# Patient Record
Sex: FEMALE | Race: WHITE | NOT HISPANIC OR LATINO | Employment: OTHER | URBAN - METROPOLITAN AREA
[De-identification: names, ages, dates, MRNs, and addresses within clinical notes are randomized per-mention and may not be internally consistent; named-entity substitution may affect disease eponyms.]

---

## 2025-03-03 ENCOUNTER — HOSPITAL ENCOUNTER (OUTPATIENT)
Dept: RADIOLOGY | Facility: MEDICAL CENTER | Age: 80
End: 2025-03-03
Payer: MEDICARE

## 2025-03-03 ENCOUNTER — HOSPITAL ENCOUNTER (INPATIENT)
Facility: MEDICAL CENTER | Age: 80
LOS: 3 days | DRG: 737 | End: 2025-03-07
Attending: EMERGENCY MEDICINE | Admitting: SPECIALIST
Payer: MEDICARE

## 2025-03-03 DIAGNOSIS — R19.00 ABDOMINAL MASS, UNSPECIFIED ABDOMINAL LOCATION: ICD-10-CM

## 2025-03-03 DIAGNOSIS — R65.10 SIRS (SYSTEMIC INFLAMMATORY RESPONSE SYNDROME) (HCC): ICD-10-CM

## 2025-03-03 DIAGNOSIS — R10.30 LOWER ABDOMINAL PAIN: ICD-10-CM

## 2025-03-03 LAB
ALBUMIN SERPL BCP-MCNC: 3.7 G/DL (ref 3.2–4.9)
ALBUMIN/GLOB SERPL: 1.1 G/DL
ALP SERPL-CCNC: 78 U/L (ref 30–99)
ALT SERPL-CCNC: 21 U/L (ref 2–50)
ANION GAP SERPL CALC-SCNC: 13 MMOL/L (ref 7–16)
APTT PPP: 30.5 SEC (ref 24.7–36)
AST SERPL-CCNC: 34 U/L (ref 12–45)
BASOPHILS # BLD AUTO: 0 % (ref 0–1.8)
BASOPHILS # BLD: 0 K/UL (ref 0–0.12)
BILIRUB SERPL-MCNC: 0.7 MG/DL (ref 0.1–1.5)
BUN SERPL-MCNC: 8 MG/DL (ref 8–22)
CALCIUM ALBUM COR SERPL-MCNC: 8.8 MG/DL (ref 8.5–10.5)
CALCIUM SERPL-MCNC: 8.6 MG/DL (ref 8.5–10.5)
CHLORIDE SERPL-SCNC: 98 MMOL/L (ref 96–112)
CK SERPL-CCNC: 54 U/L (ref 0–154)
CO2 SERPL-SCNC: 24 MMOL/L (ref 20–33)
CREAT SERPL-MCNC: 0.66 MG/DL (ref 0.5–1.4)
EKG IMPRESSION: NORMAL
EOSINOPHIL # BLD AUTO: 0 K/UL (ref 0–0.51)
EOSINOPHIL NFR BLD: 0 % (ref 0–6.9)
ERYTHROCYTE [DISTWIDTH] IN BLOOD BY AUTOMATED COUNT: 40.6 FL (ref 35.9–50)
GFR SERPLBLD CREATININE-BSD FMLA CKD-EPI: 89 ML/MIN/1.73 M 2
GLOBULIN SER CALC-MCNC: 3.4 G/DL (ref 1.9–3.5)
GLUCOSE SERPL-MCNC: 129 MG/DL (ref 65–99)
HCT VFR BLD AUTO: 41.2 % (ref 37–47)
HGB BLD-MCNC: 13.9 G/DL (ref 12–16)
INR PPP: 1.32 (ref 0.87–1.13)
LYMPHOCYTES # BLD AUTO: 3.78 K/UL (ref 1–4.8)
LYMPHOCYTES NFR BLD: 17.2 % (ref 22–41)
MANUAL DIFF BLD: NORMAL
MCH RBC QN AUTO: 30.9 PG (ref 27–33)
MCHC RBC AUTO-ENTMCNC: 33.7 G/DL (ref 32.2–35.5)
MCV RBC AUTO: 91.6 FL (ref 81.4–97.8)
MONOCYTES # BLD AUTO: 2.09 K/UL (ref 0–0.85)
MONOCYTES NFR BLD AUTO: 9.5 % (ref 0–13.4)
MORPHOLOGY BLD-IMP: NORMAL
NEUTROPHILS # BLD AUTO: 16.13 K/UL (ref 1.82–7.42)
NEUTROPHILS NFR BLD: 73.3 % (ref 44–72)
NRBC # BLD AUTO: 0 K/UL
NRBC BLD-RTO: 0 /100 WBC (ref 0–0.2)
PLATELET # BLD AUTO: 272 K/UL (ref 164–446)
PLATELET BLD QL SMEAR: NORMAL
PMV BLD AUTO: 9.8 FL (ref 9–12.9)
POTASSIUM SERPL-SCNC: 3.6 MMOL/L (ref 3.6–5.5)
PROT SERPL-MCNC: 7.1 G/DL (ref 6–8.2)
PROTHROMBIN TIME: 16.4 SEC (ref 12–14.6)
RBC # BLD AUTO: 4.5 M/UL (ref 4.2–5.4)
RBC BLD AUTO: NORMAL
SODIUM SERPL-SCNC: 135 MMOL/L (ref 135–145)
WBC # BLD AUTO: 22 K/UL (ref 4.8–10.8)

## 2025-03-03 PROCEDURE — 83605 ASSAY OF LACTIC ACID: CPT

## 2025-03-03 PROCEDURE — 85610 PROTHROMBIN TIME: CPT

## 2025-03-03 PROCEDURE — 86905 BLOOD TYPING RBC ANTIGENS: CPT

## 2025-03-03 PROCEDURE — 86920 COMPATIBILITY TEST SPIN: CPT | Mod: 91

## 2025-03-03 PROCEDURE — 85027 COMPLETE CBC AUTOMATED: CPT

## 2025-03-03 PROCEDURE — 82550 ASSAY OF CK (CPK): CPT

## 2025-03-03 PROCEDURE — 80053 COMPREHEN METABOLIC PANEL: CPT

## 2025-03-03 PROCEDURE — 700105 HCHG RX REV CODE 258: Performed by: EMERGENCY MEDICINE

## 2025-03-03 PROCEDURE — 96375 TX/PRO/DX INJ NEW DRUG ADDON: CPT

## 2025-03-03 PROCEDURE — 93005 ELECTROCARDIOGRAM TRACING: CPT | Mod: TC | Performed by: EMERGENCY MEDICINE

## 2025-03-03 PROCEDURE — 36415 COLL VENOUS BLD VENIPUNCTURE: CPT

## 2025-03-03 PROCEDURE — 86922 COMPATIBILITY TEST ANTIGLOB: CPT

## 2025-03-03 PROCEDURE — 87040 BLOOD CULTURE FOR BACTERIA: CPT | Mod: 91

## 2025-03-03 PROCEDURE — 99285 EMERGENCY DEPT VISIT HI MDM: CPT

## 2025-03-03 PROCEDURE — 86850 RBC ANTIBODY SCREEN: CPT

## 2025-03-03 PROCEDURE — 85730 THROMBOPLASTIN TIME PARTIAL: CPT

## 2025-03-03 PROCEDURE — 86870 RBC ANTIBODY IDENTIFICATION: CPT

## 2025-03-03 PROCEDURE — 700111 HCHG RX REV CODE 636 W/ 250 OVERRIDE (IP): Mod: TB | Performed by: EMERGENCY MEDICINE

## 2025-03-03 PROCEDURE — 86901 BLOOD TYPING SEROLOGIC RH(D): CPT

## 2025-03-03 PROCEDURE — 85007 BL SMEAR W/DIFF WBC COUNT: CPT

## 2025-03-03 PROCEDURE — 86900 BLOOD TYPING SEROLOGIC ABO: CPT

## 2025-03-03 RX ORDER — SODIUM CHLORIDE, SODIUM LACTATE, POTASSIUM CHLORIDE, AND CALCIUM CHLORIDE .6; .31; .03; .02 G/100ML; G/100ML; G/100ML; G/100ML
1000 INJECTION, SOLUTION INTRAVENOUS ONCE
Status: COMPLETED | OUTPATIENT
Start: 2025-03-03 | End: 2025-03-04

## 2025-03-03 RX ORDER — TRAZODONE HYDROCHLORIDE 150 MG/1
150 TABLET ORAL NIGHTLY
COMMUNITY

## 2025-03-03 RX ORDER — SIMVASTATIN 40 MG
40 TABLET ORAL NIGHTLY
COMMUNITY

## 2025-03-03 RX ORDER — ONDANSETRON 2 MG/ML
4 INJECTION INTRAMUSCULAR; INTRAVENOUS ONCE
Status: COMPLETED | OUTPATIENT
Start: 2025-03-03 | End: 2025-03-03

## 2025-03-03 RX ORDER — LEVOTHYROXINE SODIUM 88 UG/1
88 TABLET ORAL
COMMUNITY

## 2025-03-03 RX ORDER — HYDROMORPHONE HYDROCHLORIDE 1 MG/ML
1 INJECTION, SOLUTION INTRAMUSCULAR; INTRAVENOUS; SUBCUTANEOUS
Status: DISCONTINUED | OUTPATIENT
Start: 2025-03-03 | End: 2025-03-04

## 2025-03-03 RX ORDER — MONTELUKAST SODIUM 10 MG/1
10 TABLET ORAL
COMMUNITY

## 2025-03-03 RX ADMIN — ONDANSETRON 4 MG: 2 INJECTION INTRAMUSCULAR; INTRAVENOUS at 23:44

## 2025-03-03 RX ADMIN — HYDROMORPHONE HYDROCHLORIDE 1 MG: 1 INJECTION, SOLUTION INTRAMUSCULAR; INTRAVENOUS; SUBCUTANEOUS at 23:42

## 2025-03-03 RX ADMIN — PIPERACILLIN AND TAZOBACTAM 3.38 G: 3; .375 INJECTION, POWDER, FOR SOLUTION INTRAVENOUS at 23:59

## 2025-03-03 RX ADMIN — SODIUM CHLORIDE, POTASSIUM CHLORIDE, SODIUM LACTATE AND CALCIUM CHLORIDE 1000 ML: 600; 310; 30; 20 INJECTION, SOLUTION INTRAVENOUS at 23:45

## 2025-03-03 ASSESSMENT — FIBROSIS 4 INDEX: FIB4 SCORE: 2.15

## 2025-03-03 ASSESSMENT — PAIN DESCRIPTION - PAIN TYPE: TYPE: ACUTE PAIN

## 2025-03-04 ENCOUNTER — ANESTHESIA (OUTPATIENT)
Dept: SURGERY | Facility: MEDICAL CENTER | Age: 80
DRG: 737 | End: 2025-03-04
Payer: MEDICARE

## 2025-03-04 ENCOUNTER — HOSPITAL ENCOUNTER (OUTPATIENT)
Dept: RADIOLOGY | Facility: MEDICAL CENTER | Age: 80
End: 2025-03-04

## 2025-03-04 ENCOUNTER — ANESTHESIA EVENT (OUTPATIENT)
Dept: SURGERY | Facility: MEDICAL CENTER | Age: 80
DRG: 737 | End: 2025-03-04
Payer: MEDICARE

## 2025-03-04 PROBLEM — R19.03 ABDOMINAL MASS, RIGHT LOWER QUADRANT: Status: ACTIVE | Noted: 2025-03-04

## 2025-03-04 PROBLEM — J45.909 ASTHMA: Status: ACTIVE | Noted: 2025-03-04

## 2025-03-04 LAB
ABO GROUP BLD: ABNORMAL
BARCODED ABORH UBTYP: 5100
BARCODED ABORH UBTYP: 5100
BARCODED PRD CODE UBPRD: ABNORMAL
BARCODED PRD CODE UBPRD: ABNORMAL
BARCODED UNIT NUM UBUNT: ABNORMAL
BARCODED UNIT NUM UBUNT: ABNORMAL
BLD GP AB INVEST PLASRBC-IMP: ABNORMAL
BLD GP AB INVEST PLASRBC-IMP: ABNORMAL
BLD GP AB SCN SERPL QL: ABNORMAL
COMPONENT R 8504R: ABNORMAL
COMPONENT R 8504R: ABNORMAL
LACTATE SERPL-SCNC: 1.3 MMOL/L (ref 0.5–2)
LITTLE C AG RBC QL: NORMAL
PRODUCT TYPE UPROD: ABNORMAL
PRODUCT TYPE UPROD: ABNORMAL
RH BLD: ABNORMAL
UNIT STATUS USTAT: ABNORMAL
UNIT STATUS USTAT: ABNORMAL

## 2025-03-04 PROCEDURE — 770004 HCHG ROOM/CARE - ONCOLOGY PRIVATE *

## 2025-03-04 PROCEDURE — 86902 BLOOD TYPE ANTIGEN DONOR EA: CPT

## 2025-03-04 PROCEDURE — 8E0W4CZ ROBOTIC ASSISTED PROCEDURE OF TRUNK REGION, PERCUTANEOUS ENDOSCOPIC APPROACH: ICD-10-PCS | Performed by: SPECIALIST

## 2025-03-04 PROCEDURE — A9270 NON-COVERED ITEM OR SERVICE: HCPCS | Performed by: EMERGENCY MEDICINE

## 2025-03-04 PROCEDURE — 88307 TISSUE EXAM BY PATHOLOGIST: CPT | Mod: 59 | Performed by: STUDENT IN AN ORGANIZED HEALTH CARE EDUCATION/TRAINING PROGRAM

## 2025-03-04 PROCEDURE — 88341 IMHCHEM/IMCYTCHM EA ADD ANTB: CPT | Mod: 91 | Performed by: STUDENT IN AN ORGANIZED HEALTH CARE EDUCATION/TRAINING PROGRAM

## 2025-03-04 PROCEDURE — 88342 IMHCHEM/IMCYTCHM 1ST ANTB: CPT | Mod: 26,59 | Performed by: STUDENT IN AN ORGANIZED HEALTH CARE EDUCATION/TRAINING PROGRAM

## 2025-03-04 PROCEDURE — 88307 TISSUE EXAM BY PATHOLOGIST: CPT | Mod: 26 | Performed by: STUDENT IN AN ORGANIZED HEALTH CARE EDUCATION/TRAINING PROGRAM

## 2025-03-04 PROCEDURE — 88331 PATH CONSLTJ SURG 1 BLK 1SPC: CPT | Performed by: STUDENT IN AN ORGANIZED HEALTH CARE EDUCATION/TRAINING PROGRAM

## 2025-03-04 PROCEDURE — 160031 HCHG SURGERY MINUTES - 1ST 30 MINS LEVEL 5: Performed by: SPECIALIST

## 2025-03-04 PROCEDURE — 160035 HCHG PACU - 1ST 60 MINS PHASE I: Performed by: SPECIALIST

## 2025-03-04 PROCEDURE — 700101 HCHG RX REV CODE 250: Performed by: SPECIALIST

## 2025-03-04 PROCEDURE — 700111 HCHG RX REV CODE 636 W/ 250 OVERRIDE (IP): Performed by: ANESTHESIOLOGY

## 2025-03-04 PROCEDURE — 700102 HCHG RX REV CODE 250 W/ 637 OVERRIDE(OP)

## 2025-03-04 PROCEDURE — 700111 HCHG RX REV CODE 636 W/ 250 OVERRIDE (IP): Mod: JZ | Performed by: EMERGENCY MEDICINE

## 2025-03-04 PROCEDURE — A9270 NON-COVERED ITEM OR SERVICE: HCPCS | Performed by: SPECIALIST

## 2025-03-04 PROCEDURE — 160002 HCHG RECOVERY MINUTES (STAT): Performed by: SPECIALIST

## 2025-03-04 PROCEDURE — 700111 HCHG RX REV CODE 636 W/ 250 OVERRIDE (IP): Mod: JZ | Performed by: ANESTHESIOLOGY

## 2025-03-04 PROCEDURE — 502714 HCHG ROBOTIC SURGERY SERVICES: Performed by: SPECIALIST

## 2025-03-04 PROCEDURE — 700111 HCHG RX REV CODE 636 W/ 250 OVERRIDE (IP): Mod: JZ | Performed by: SPECIALIST

## 2025-03-04 PROCEDURE — 700101 HCHG RX REV CODE 250: Performed by: ANESTHESIOLOGY

## 2025-03-04 PROCEDURE — 700102 HCHG RX REV CODE 250 W/ 637 OVERRIDE(OP): Performed by: SPECIALIST

## 2025-03-04 PROCEDURE — 160048 HCHG OR STATISTICAL LEVEL 1-5: Performed by: SPECIALIST

## 2025-03-04 PROCEDURE — 160042 HCHG SURGERY MINUTES - EA ADDL 1 MIN LEVEL 5: Performed by: SPECIALIST

## 2025-03-04 PROCEDURE — C1725 CATH, TRANSLUMIN NON-LASER: HCPCS | Performed by: SPECIALIST

## 2025-03-04 PROCEDURE — A9270 NON-COVERED ITEM OR SERVICE: HCPCS

## 2025-03-04 PROCEDURE — 700105 HCHG RX REV CODE 258: Performed by: EMERGENCY MEDICINE

## 2025-03-04 PROCEDURE — 0DTU4ZZ RESECTION OF OMENTUM, PERCUTANEOUS ENDOSCOPIC APPROACH: ICD-10-PCS | Performed by: SPECIALIST

## 2025-03-04 PROCEDURE — 88360 TUMOR IMMUNOHISTOCHEM/MANUAL: CPT | Mod: 26 | Performed by: STUDENT IN AN ORGANIZED HEALTH CARE EDUCATION/TRAINING PROGRAM

## 2025-03-04 PROCEDURE — 88342 IMHCHEM/IMCYTCHM 1ST ANTB: CPT | Performed by: STUDENT IN AN ORGANIZED HEALTH CARE EDUCATION/TRAINING PROGRAM

## 2025-03-04 PROCEDURE — 160009 HCHG ANES TIME/MIN: Performed by: SPECIALIST

## 2025-03-04 PROCEDURE — 0UT0FZZ RESECTION OF RIGHT OVARY, VIA NATURAL OR ARTIFICIAL OPENING WITH PERCUTANEOUS ENDOSCOPIC ASSISTANCE: ICD-10-PCS | Performed by: SPECIALIST

## 2025-03-04 PROCEDURE — 700105 HCHG RX REV CODE 258: Performed by: ANESTHESIOLOGY

## 2025-03-04 PROCEDURE — 160015 HCHG STAT PREOP MINUTES: Performed by: SPECIALIST

## 2025-03-04 PROCEDURE — 88360 TUMOR IMMUNOHISTOCHEM/MANUAL: CPT | Performed by: STUDENT IN AN ORGANIZED HEALTH CARE EDUCATION/TRAINING PROGRAM

## 2025-03-04 PROCEDURE — 700102 HCHG RX REV CODE 250 W/ 637 OVERRIDE(OP): Performed by: EMERGENCY MEDICINE

## 2025-03-04 PROCEDURE — 88341 IMHCHEM/IMCYTCHM EA ADD ANTB: CPT | Mod: 26,59 | Performed by: STUDENT IN AN ORGANIZED HEALTH CARE EDUCATION/TRAINING PROGRAM

## 2025-03-04 PROCEDURE — 88331 PATH CONSLTJ SURG 1 BLK 1SPC: CPT | Mod: 26 | Performed by: STUDENT IN AN ORGANIZED HEALTH CARE EDUCATION/TRAINING PROGRAM

## 2025-03-04 PROCEDURE — 0UT5FZZ RESECTION OF RIGHT FALLOPIAN TUBE, VIA NATURAL OR ARTIFICIAL OPENING WITH PERCUTANEOUS ENDOSCOPIC ASSISTANCE: ICD-10-PCS | Performed by: SPECIALIST

## 2025-03-04 PROCEDURE — 96365 THER/PROPH/DIAG IV INF INIT: CPT

## 2025-03-04 RX ORDER — ESMOLOL HYDROCHLORIDE 10 MG/ML
INJECTION INTRAVENOUS PRN
Status: DISCONTINUED | OUTPATIENT
Start: 2025-03-04 | End: 2025-03-04 | Stop reason: SURG

## 2025-03-04 RX ORDER — MEPERIDINE HYDROCHLORIDE 25 MG/ML
12.5 INJECTION INTRAMUSCULAR; INTRAVENOUS; SUBCUTANEOUS
Status: DISCONTINUED | OUTPATIENT
Start: 2025-03-04 | End: 2025-03-04 | Stop reason: HOSPADM

## 2025-03-04 RX ORDER — EPHEDRINE SULFATE 50 MG/ML
5 INJECTION, SOLUTION INTRAVENOUS
Status: DISCONTINUED | OUTPATIENT
Start: 2025-03-04 | End: 2025-03-04 | Stop reason: HOSPADM

## 2025-03-04 RX ORDER — HYDROMORPHONE HYDROCHLORIDE 1 MG/ML
0.4 INJECTION, SOLUTION INTRAMUSCULAR; INTRAVENOUS; SUBCUTANEOUS
Status: DISCONTINUED | OUTPATIENT
Start: 2025-03-04 | End: 2025-03-04 | Stop reason: HOSPADM

## 2025-03-04 RX ORDER — BUPIVACAINE HYDROCHLORIDE AND EPINEPHRINE 2.5; 5 MG/ML; UG/ML
INJECTION, SOLUTION EPIDURAL; INFILTRATION; INTRACAUDAL; PERINEURAL
Status: DISCONTINUED | OUTPATIENT
Start: 2025-03-04 | End: 2025-03-04 | Stop reason: HOSPADM

## 2025-03-04 RX ORDER — LABETALOL HYDROCHLORIDE 5 MG/ML
5 INJECTION, SOLUTION INTRAVENOUS
Status: DISCONTINUED | OUTPATIENT
Start: 2025-03-04 | End: 2025-03-04 | Stop reason: HOSPADM

## 2025-03-04 RX ORDER — LIDOCAINE HYDROCHLORIDE 40 MG/ML
SOLUTION TOPICAL PRN
Status: DISCONTINUED | OUTPATIENT
Start: 2025-03-04 | End: 2025-03-04 | Stop reason: SURG

## 2025-03-04 RX ORDER — SODIUM CHLORIDE, SODIUM LACTATE, POTASSIUM CHLORIDE, CALCIUM CHLORIDE 600; 310; 30; 20 MG/100ML; MG/100ML; MG/100ML; MG/100ML
INJECTION, SOLUTION INTRAVENOUS
Status: DISCONTINUED | OUTPATIENT
Start: 2025-03-04 | End: 2025-03-04 | Stop reason: SURG

## 2025-03-04 RX ORDER — ONDANSETRON 2 MG/ML
INJECTION INTRAMUSCULAR; INTRAVENOUS PRN
Status: DISCONTINUED | OUTPATIENT
Start: 2025-03-04 | End: 2025-03-04 | Stop reason: SURG

## 2025-03-04 RX ORDER — HALOPERIDOL 5 MG/ML
1 INJECTION INTRAMUSCULAR
Status: DISCONTINUED | OUTPATIENT
Start: 2025-03-04 | End: 2025-03-04 | Stop reason: HOSPADM

## 2025-03-04 RX ORDER — HYDROMORPHONE HYDROCHLORIDE 1 MG/ML
1 INJECTION, SOLUTION INTRAMUSCULAR; INTRAVENOUS; SUBCUTANEOUS
Status: COMPLETED | OUTPATIENT
Start: 2025-03-04 | End: 2025-03-04

## 2025-03-04 RX ORDER — HYDROMORPHONE HYDROCHLORIDE 4 MG/1
2 TABLET ORAL
Refills: 0 | Status: DISCONTINUED | OUTPATIENT
Start: 2025-03-04 | End: 2025-03-07 | Stop reason: HOSPADM

## 2025-03-04 RX ORDER — CEFAZOLIN SODIUM 1 G/3ML
INJECTION, POWDER, FOR SOLUTION INTRAMUSCULAR; INTRAVENOUS PRN
Status: DISCONTINUED | OUTPATIENT
Start: 2025-03-04 | End: 2025-03-04 | Stop reason: SURG

## 2025-03-04 RX ORDER — ACETAMINOPHEN 650 MG
TABLET, EXTENDED RELEASE ORAL
Status: DISCONTINUED | OUTPATIENT
Start: 2025-03-04 | End: 2025-03-04 | Stop reason: HOSPADM

## 2025-03-04 RX ORDER — LIDOCAINE HYDROCHLORIDE 20 MG/ML
INJECTION, SOLUTION EPIDURAL; INFILTRATION; INTRACAUDAL; PERINEURAL PRN
Status: DISCONTINUED | OUTPATIENT
Start: 2025-03-04 | End: 2025-03-04 | Stop reason: SURG

## 2025-03-04 RX ORDER — SODIUM CHLORIDE, SODIUM LACTATE, POTASSIUM CHLORIDE, CALCIUM CHLORIDE 600; 310; 30; 20 MG/100ML; MG/100ML; MG/100ML; MG/100ML
INJECTION, SOLUTION INTRAVENOUS CONTINUOUS
Status: ACTIVE | OUTPATIENT
Start: 2025-03-04 | End: 2025-03-04

## 2025-03-04 RX ORDER — DEXAMETHASONE SODIUM PHOSPHATE 4 MG/ML
INJECTION, SOLUTION INTRA-ARTICULAR; INTRALESIONAL; INTRAMUSCULAR; INTRAVENOUS; SOFT TISSUE PRN
Status: DISCONTINUED | OUTPATIENT
Start: 2025-03-04 | End: 2025-03-04 | Stop reason: SURG

## 2025-03-04 RX ORDER — LABETALOL HYDROCHLORIDE 5 MG/ML
INJECTION, SOLUTION INTRAVENOUS PRN
Status: DISCONTINUED | OUTPATIENT
Start: 2025-03-04 | End: 2025-03-04 | Stop reason: SURG

## 2025-03-04 RX ORDER — HYDROMORPHONE HYDROCHLORIDE 1 MG/ML
0.1 INJECTION, SOLUTION INTRAMUSCULAR; INTRAVENOUS; SUBCUTANEOUS
Status: DISCONTINUED | OUTPATIENT
Start: 2025-03-04 | End: 2025-03-04 | Stop reason: HOSPADM

## 2025-03-04 RX ORDER — DIPHENHYDRAMINE HYDROCHLORIDE 50 MG/ML
12.5 INJECTION, SOLUTION INTRAMUSCULAR; INTRAVENOUS
Status: DISCONTINUED | OUTPATIENT
Start: 2025-03-04 | End: 2025-03-04 | Stop reason: HOSPADM

## 2025-03-04 RX ORDER — METRONIDAZOLE 500 MG/100ML
INJECTION, SOLUTION INTRAVENOUS PRN
Status: DISCONTINUED | OUTPATIENT
Start: 2025-03-04 | End: 2025-03-04 | Stop reason: SURG

## 2025-03-04 RX ORDER — HYDROMORPHONE HYDROCHLORIDE 1 MG/ML
0.2 INJECTION, SOLUTION INTRAMUSCULAR; INTRAVENOUS; SUBCUTANEOUS
Status: DISCONTINUED | OUTPATIENT
Start: 2025-03-04 | End: 2025-03-04 | Stop reason: HOSPADM

## 2025-03-04 RX ORDER — ALBUTEROL SULFATE 5 MG/ML
2.5 SOLUTION RESPIRATORY (INHALATION)
Status: DISCONTINUED | OUTPATIENT
Start: 2025-03-04 | End: 2025-03-04 | Stop reason: HOSPADM

## 2025-03-04 RX ORDER — METOPROLOL TARTRATE 1 MG/ML
1 INJECTION, SOLUTION INTRAVENOUS
Status: DISCONTINUED | OUTPATIENT
Start: 2025-03-04 | End: 2025-03-04 | Stop reason: HOSPADM

## 2025-03-04 RX ORDER — TRAZODONE HYDROCHLORIDE 100 MG/1
150 TABLET ORAL ONCE
Status: COMPLETED | OUTPATIENT
Start: 2025-03-04 | End: 2025-03-04

## 2025-03-04 RX ORDER — SODIUM CHLORIDE, SODIUM LACTATE, POTASSIUM CHLORIDE, CALCIUM CHLORIDE 600; 310; 30; 20 MG/100ML; MG/100ML; MG/100ML; MG/100ML
INJECTION, SOLUTION INTRAVENOUS CONTINUOUS
Status: DISCONTINUED | OUTPATIENT
Start: 2025-03-04 | End: 2025-03-04 | Stop reason: HOSPADM

## 2025-03-04 RX ORDER — ONDANSETRON 2 MG/ML
4 INJECTION INTRAMUSCULAR; INTRAVENOUS
Status: DISCONTINUED | OUTPATIENT
Start: 2025-03-04 | End: 2025-03-04 | Stop reason: HOSPADM

## 2025-03-04 RX ORDER — ONDANSETRON 2 MG/ML
4 INJECTION INTRAMUSCULAR; INTRAVENOUS
Status: COMPLETED | OUTPATIENT
Start: 2025-03-04 | End: 2025-03-04

## 2025-03-04 RX ORDER — PHENYLEPHRINE HCL IN 0.9% NACL 1 MG/10 ML
SYRINGE (ML) INTRAVENOUS PRN
Status: DISCONTINUED | OUTPATIENT
Start: 2025-03-04 | End: 2025-03-04 | Stop reason: SURG

## 2025-03-04 RX ORDER — ONDANSETRON 2 MG/ML
4 INJECTION INTRAMUSCULAR; INTRAVENOUS EVERY 4 HOURS PRN
Status: DISCONTINUED | OUTPATIENT
Start: 2025-03-04 | End: 2025-03-07 | Stop reason: HOSPADM

## 2025-03-04 RX ORDER — ACETAMINOPHEN 325 MG/1
650 TABLET ORAL EVERY 4 HOURS PRN
Status: DISCONTINUED | OUTPATIENT
Start: 2025-03-04 | End: 2025-03-05

## 2025-03-04 RX ORDER — ROCURONIUM BROMIDE 10 MG/ML
INJECTION, SOLUTION INTRAVENOUS PRN
Status: DISCONTINUED | OUTPATIENT
Start: 2025-03-04 | End: 2025-03-04 | Stop reason: SURG

## 2025-03-04 RX ADMIN — Medication 100 MCG: at 16:48

## 2025-03-04 RX ADMIN — DEXAMETHASONE SODIUM PHOSPHATE 8 MG: 4 INJECTION INTRA-ARTICULAR; INTRALESIONAL; INTRAMUSCULAR; INTRAVENOUS; SOFT TISSUE at 16:48

## 2025-03-04 RX ADMIN — ONDANSETRON 4 MG: 2 INJECTION INTRAMUSCULAR; INTRAVENOUS at 02:32

## 2025-03-04 RX ADMIN — PROPOFOL 150 MG: 10 INJECTION, EMULSION INTRAVENOUS at 16:43

## 2025-03-04 RX ADMIN — METRONIDAZOLE 500 MG: 5 INJECTION, SOLUTION INTRAVENOUS at 16:59

## 2025-03-04 RX ADMIN — ALBUTEROL SULFATE 2.5 MG: 2.5 SOLUTION RESPIRATORY (INHALATION) at 19:32

## 2025-03-04 RX ADMIN — FENTANYL CITRATE 50 MCG: 50 INJECTION, SOLUTION INTRAMUSCULAR; INTRAVENOUS at 18:20

## 2025-03-04 RX ADMIN — MORPHINE SULFATE 5 MG: 10 INJECTION INTRAVENOUS at 19:07

## 2025-03-04 RX ADMIN — TRAZODONE HYDROCHLORIDE 150 MG: 100 TABLET ORAL at 01:34

## 2025-03-04 RX ADMIN — CEFAZOLIN 2 G: 1 INJECTION, POWDER, FOR SOLUTION INTRAMUSCULAR; INTRAVENOUS at 17:02

## 2025-03-04 RX ADMIN — HALOPERIDOL LACTATE 1 MG: 5 INJECTION, SOLUTION INTRAMUSCULAR at 19:25

## 2025-03-04 RX ADMIN — SODIUM CHLORIDE, POTASSIUM CHLORIDE, SODIUM LACTATE AND CALCIUM CHLORIDE: 600; 310; 30; 20 INJECTION, SOLUTION INTRAVENOUS at 16:35

## 2025-03-04 RX ADMIN — ONDANSETRON 4 MG: 2 INJECTION INTRAMUSCULAR; INTRAVENOUS at 21:13

## 2025-03-04 RX ADMIN — HYDROMORPHONE HYDROCHLORIDE 1 MG: 1 INJECTION, SOLUTION INTRAMUSCULAR; INTRAVENOUS; SUBCUTANEOUS at 01:34

## 2025-03-04 RX ADMIN — LIDOCAINE HYDROCHLORIDE 4 ML: 40 SOLUTION TOPICAL at 16:45

## 2025-03-04 RX ADMIN — ESMOLOL HYDROCHLORIDE 20 MG: 100 INJECTION, SOLUTION INTRAVENOUS at 16:41

## 2025-03-04 RX ADMIN — HYDROMORPHONE HYDROCHLORIDE 1 MG: 1 INJECTION, SOLUTION INTRAMUSCULAR; INTRAVENOUS; SUBCUTANEOUS at 07:22

## 2025-03-04 RX ADMIN — MORPHINE SULFATE 5 MG: 10 INJECTION INTRAVENOUS at 19:21

## 2025-03-04 RX ADMIN — ROCURONIUM BROMIDE 20 MG: 10 INJECTION INTRAVENOUS at 17:53

## 2025-03-04 RX ADMIN — FENTANYL CITRATE 50 MCG: 50 INJECTION, SOLUTION INTRAMUSCULAR; INTRAVENOUS at 19:28

## 2025-03-04 RX ADMIN — ONDANSETRON 4 MG: 2 INJECTION INTRAMUSCULAR; INTRAVENOUS at 17:13

## 2025-03-04 RX ADMIN — SODIUM CHLORIDE, POTASSIUM CHLORIDE, SODIUM LACTATE AND CALCIUM CHLORIDE: 600; 310; 30; 20 INJECTION, SOLUTION INTRAVENOUS at 01:06

## 2025-03-04 RX ADMIN — ROCURONIUM BROMIDE 50 MG: 10 INJECTION INTRAVENOUS at 16:43

## 2025-03-04 RX ADMIN — SUGAMMADEX 200 MG: 100 INJECTION, SOLUTION INTRAVENOUS at 18:42

## 2025-03-04 RX ADMIN — Medication 200 MCG: at 16:50

## 2025-03-04 RX ADMIN — FENTANYL CITRATE 50 MCG: 50 INJECTION, SOLUTION INTRAMUSCULAR; INTRAVENOUS at 16:36

## 2025-03-04 RX ADMIN — FENTANYL CITRATE 100 MCG: 50 INJECTION, SOLUTION INTRAMUSCULAR; INTRAVENOUS at 17:09

## 2025-03-04 RX ADMIN — LABETALOL HYDROCHLORIDE 10 MG: 5 INJECTION, SOLUTION INTRAVENOUS at 17:20

## 2025-03-04 RX ADMIN — FENTANYL CITRATE 50 MCG: 50 INJECTION, SOLUTION INTRAMUSCULAR; INTRAVENOUS at 19:30

## 2025-03-04 RX ADMIN — LIDOCAINE HYDROCHLORIDE 75 MG: 20 INJECTION, SOLUTION EPIDURAL; INFILTRATION; INTRACAUDAL; PERINEURAL at 16:43

## 2025-03-04 RX ADMIN — HYDROMORPHONE HYDROCHLORIDE 1 MG: 1 INJECTION, SOLUTION INTRAMUSCULAR; INTRAVENOUS; SUBCUTANEOUS at 14:07

## 2025-03-04 RX ADMIN — FENTANYL CITRATE 50 MCG: 50 INJECTION, SOLUTION INTRAMUSCULAR; INTRAVENOUS at 18:44

## 2025-03-04 RX ADMIN — HYDROMORPHONE HYDROCHLORIDE 2 MG: 4 TABLET ORAL at 23:56

## 2025-03-04 RX ADMIN — HYDROMORPHONE HYDROCHLORIDE 1 MG: 1 INJECTION, SOLUTION INTRAMUSCULAR; INTRAVENOUS; SUBCUTANEOUS at 10:25

## 2025-03-04 SDOH — ECONOMIC STABILITY: TRANSPORTATION INSECURITY
IN THE PAST 12 MONTHS, HAS THE LACK OF TRANSPORTATION KEPT YOU FROM MEDICAL APPOINTMENTS OR FROM GETTING MEDICATIONS?: NO

## 2025-03-04 SDOH — ECONOMIC STABILITY: TRANSPORTATION INSECURITY
IN THE PAST 12 MONTHS, HAS LACK OF RELIABLE TRANSPORTATION KEPT YOU FROM MEDICAL APPOINTMENTS, MEETINGS, WORK OR FROM GETTING THINGS NEEDED FOR DAILY LIVING?: NO

## 2025-03-04 ASSESSMENT — SOCIAL DETERMINANTS OF HEALTH (SDOH)
WITHIN THE LAST YEAR, HAVE YOU BEEN HUMILIATED OR EMOTIONALLY ABUSED IN OTHER WAYS BY YOUR PARTNER OR EX-PARTNER?: NO
WITHIN THE PAST 12 MONTHS, YOU WORRIED THAT YOUR FOOD WOULD RUN OUT BEFORE YOU GOT THE MONEY TO BUY MORE: SOMETIMES TRUE
WITHIN THE LAST YEAR, HAVE YOU BEEN KICKED, HIT, SLAPPED, OR OTHERWISE PHYSICALLY HURT BY YOUR PARTNER OR EX-PARTNER?: NO
IN THE PAST 12 MONTHS, HAS THE ELECTRIC, GAS, OIL, OR WATER COMPANY THREATENED TO SHUT OFF SERVICE IN YOUR HOME?: NO
WITHIN THE LAST YEAR, HAVE TO BEEN RAPED OR FORCED TO HAVE ANY KIND OF SEXUAL ACTIVITY BY YOUR PARTNER OR EX-PARTNER?: NO
WITHIN THE PAST 12 MONTHS, THE FOOD YOU BOUGHT JUST DIDN'T LAST AND YOU DIDN'T HAVE MONEY TO GET MORE: SOMETIMES TRUE
WITHIN THE LAST YEAR, HAVE YOU BEEN AFRAID OF YOUR PARTNER OR EX-PARTNER?: NO

## 2025-03-04 ASSESSMENT — PAIN DESCRIPTION - PAIN TYPE
TYPE: ACUTE PAIN
TYPE: SURGICAL PAIN
TYPE: ACUTE PAIN
TYPE: ACUTE PAIN
TYPE: SURGICAL PAIN
TYPE: SURGICAL PAIN
TYPE: ACUTE PAIN
TYPE: SURGICAL PAIN

## 2025-03-04 ASSESSMENT — ENCOUNTER SYMPTOMS
CHILLS: 0
WEAKNESS: 0
MYALGIAS: 0
HEARTBURN: 0
NAUSEA: 1
VOMITING: 1
ABDOMINAL PAIN: 1
DIARRHEA: 0
CLAUDICATION: 0
PALPITATIONS: 0
COUGH: 0
NERVOUS/ANXIOUS: 0
BLOOD IN STOOL: 0
DIZZINESS: 0
CONSTIPATION: 1
FEVER: 1
HEADACHES: 0
WEIGHT LOSS: 0

## 2025-03-04 ASSESSMENT — COGNITIVE AND FUNCTIONAL STATUS - GENERAL
MOVING FROM LYING ON BACK TO SITTING ON SIDE OF FLAT BED: A LITTLE
HELP NEEDED FOR BATHING: A LITTLE
MOBILITY SCORE: 14
TURNING FROM BACK TO SIDE WHILE IN FLAT BAD: A LITTLE
MOVING TO AND FROM BED TO CHAIR: A LOT
WALKING IN HOSPITAL ROOM: A LOT
CLIMB 3 TO 5 STEPS WITH RAILING: A LOT
DRESSING REGULAR UPPER BODY CLOTHING: A LITTLE
SUGGESTED CMS G CODE MODIFIER MOBILITY: CL
SUGGESTED CMS G CODE MODIFIER DAILY ACTIVITY: CJ
DRESSING REGULAR LOWER BODY CLOTHING: A LITTLE
TOILETING: A LITTLE
STANDING UP FROM CHAIR USING ARMS: A LOT
DAILY ACTIVITIY SCORE: 20

## 2025-03-04 ASSESSMENT — PATIENT HEALTH QUESTIONNAIRE - PHQ9
SUM OF ALL RESPONSES TO PHQ9 QUESTIONS 1 AND 2: 0
1. LITTLE INTEREST OR PLEASURE IN DOING THINGS: NOT AT ALL
2. FEELING DOWN, DEPRESSED, IRRITABLE, OR HOPELESS: NOT AT ALL

## 2025-03-04 ASSESSMENT — LIFESTYLE VARIABLES
TOTAL SCORE: 0
TOTAL SCORE: 0
HAVE YOU EVER FELT YOU SHOULD CUT DOWN ON YOUR DRINKING: NO
HOW MANY TIMES IN THE PAST YEAR HAVE YOU HAD 5 OR MORE DRINKS IN A DAY: 0
TOTAL SCORE: 0
ON A TYPICAL DAY WHEN YOU DRINK ALCOHOL HOW MANY DRINKS DO YOU HAVE: 0
EVER HAD A DRINK FIRST THING IN THE MORNING TO STEADY YOUR NERVES TO GET RID OF A HANGOVER: NO
EVER FELT BAD OR GUILTY ABOUT YOUR DRINKING: NO
CONSUMPTION TOTAL: NEGATIVE
HAVE PEOPLE ANNOYED YOU BY CRITICIZING YOUR DRINKING: NO
AVERAGE NUMBER OF DAYS PER WEEK YOU HAVE A DRINK CONTAINING ALCOHOL: 0
ALCOHOL_USE: NO

## 2025-03-04 ASSESSMENT — FIBROSIS 4 INDEX: FIB4 SCORE: 2.15

## 2025-03-04 NOTE — DISCHARGE PLANNING
Care Transition Team Assessment    Patient is a 79 year-old female admitted for Abdominal mass; right lower quadrant. Please see patient's H&P for prior medical history. SW Student met with patient at bedside to complete assessment. Patient is A&Ox4 and able to verify the information on the face sheet. Patient lives with  in a one story house with two steps to enter, Aj Olson (p) [phone]; NOK and emergency contact. No Advance Directive on file.Prior to admission patient is independent with ADL's and IADL’s. Patient does not use any DME at baseline. Patient reported that  is good support for her. Patient reports, they are retired receives monthly SSI deposits. The patient's PCP is Dr. Roque Hood. Patient's preferred pharmacy is [facility] in [place]. Patient denies a history of SNF/IPR nor HHC use in the past. Patient denies any SA or MH concerns. Patient's confirmed medical coverage via [employer]. Patient has means of transportation when medically cleared and friend will provide transport once stable for discharge.     Discussed during IDT rounds, Plan for surgery today or Thursday with Dr. Magana's team.     Information Source  Orientation Level: Oriented X4  Information Given By: Patient  Who is responsible for making decisions for patient? : Patient    Readmission Evaluation  Is this a readmission?: No    Elopement Risk  Legal Hold: No  Ambulatory or Self Mobile in Wheelchair: Yes  Disoriented: No  Psychiatric Symptoms: None  History of Wandering: No  Elopement this Admit: No  Vocalizing Wanting to Leave: No  Displays Behaviors, Body Language Wanting to Leave: No-Not at Risk for Elopement  Elopement Risk: Not at Risk for Elopement    Interdisciplinary Discharge Planning  Lives with - Patient's Self Care Capacity: Spouse, Adult Children  Patient or legal guardian wants to designate a caregiver: No  Support Systems: Family Member(s)  Housing / Facility: 1 Story House    Discharge  Preparedness  What is your plan after discharge?: Home with help  What are your discharge supports?: Child, Spouse  Prior Functional Level: Ambulatory, Independent with Activities of Daily Living, Independent with Medication Management  Difficulity with ADLs: None  Difficulity with IADLs: None    Functional Assesment  Prior Functional Level: Ambulatory, Independent with Activities of Daily Living, Independent with Medication Management    Finances  Financial Barriers to Discharge: No  Prescription Coverage: Yes    Vision / Hearing Impairment  Vision Impairment : Yes  Right Eye Vision: Impaired, Wears Glasses  Left Eye Vision: Impaired, Wears Glasses  Hearing Impairment : No    Advance Directive  Advance Directive?: None  Advance Directive offered?: AD Booklet refused    Domestic Abuse  Have you ever been the victim of abuse or violence?: No  Possible Abuse/Neglect Reported to:: Not Applicable    Psychological Assessment  History of Substance Abuse: None  History of Psychiatric Problems: No  Non-compliant with Treatment: No  Newly Diagnosed Illness: No    Discharge Risks or Barriers  Discharge risks or barriers?: Complex medical needs  Patient risk factors: Complex medical needs    Anticipated Discharge Information  Discharge Disposition: Discharged to home/self care (01)  Discharge Address: [facility] [address] [facility], [address]

## 2025-03-04 NOTE — ED NOTES
Med rec updated and complete. Allergies reviewed and updated. Allergies added to list.    Pt confirmed name and date of birth.      No antibiotic use in last 30 days.    Pt denies anticoagulant and antiplatelet medications.      Preferred Pharmacy  [facility] = [phone]

## 2025-03-04 NOTE — CARE PLAN
The patient is Watcher - Medium risk of patient condition declining or worsening    Shift Goals  Clinical Goals: Patient will remain NPO and verbalize tolerable pain level through out shift  Patient Goals: pain control and rest  Family Goals: N/a    Progress made toward(s) clinical / shift goals:    Problem: Pain - Standard  Goal: Alleviation of pain or a reduction in pain to the patient’s comfort goal  Outcome: Progressing     Problem: Fall Risk  Goal: Patient will remain free from falls  Outcome: Progressing  Note: Fall prevention measures in place, bed alarm on and connected correctly, call light within reach, hourly rounding, Education provided on fall prevention. Pt instructed to use call light prior to exiting the bed, educated on use of bed alarms, and risks and complications related to falls.     Problem: Knowledge Deficit - Standard  Goal: Patient and family/care givers will demonstrate understanding of plan of care, disease process/condition, diagnostic tests and medications  Outcome: Progressing  Note: Pt is AO4. Call light within reach. Educated and understand POC. All questions answered at this time.       Patient is not progressing towards the following goals:

## 2025-03-04 NOTE — ED PROVIDER NOTES
ED Provider Note    CHIEF COMPLAINT  Chief Complaint   Patient presents with    Abdominal Pain     Right lower quadrant pain radiating across the abdomen x 4 days, pt is transfer from [facility] for OBGYN consult.        EXTERNAL RECORDS REVIEWED  External ED Note outside ER note is reviewed as the patient had come in with nonspecific lower abdominal discomfort and during initial workup the patient had had a CT scan that showed a large mass in the lower abdomen.  The patient is status post radical hysterectomy and oophorectomy and had come back to the emergency department when pain was getting worse and ultrasound was obtained that showed a mixed cystic solid mass of the right of midline measuring 6.4 x 4.7 x 5.6 cm.  This appears to have no internal blood flow and the concern was for possible torsion versus more likely pelvic mass/malignancy.  Patient had already seen additional physicians and he received empiric antibiosis.  Pain medication was also given and the patient had no hemodynamic instability.  The workup CA 19-9 was found to be significantly elevated and this led to consultation with gynecology who recommended surgical oncology and Dr. Magana was consulted and asked that the patient be transferred.    HPI/ROS  LIMITATION TO HISTORY   Select: : None  OUTSIDE HISTORIAN(S):  none    Sujey Olson is a 79 y.o. female who presents the emergency room for evaluation of known intra-abdominal mass.  The patient had been dealing with 4 to 5 days of worsening abdominal discomfort.  She gone to an outside facility where a CT scan had shown a lower abdominal cystic structure and was concerned about the possibility of a torsed remaining ovary.  The patient is 47 years past which she believes was a radical hysterectomy and bilateral oophorectomy.  She has had cholecystectomy and appendectomy.  She had had escalating amounts of pain and discomfort that led her back to the emergency department where ultrasound  showed small amount of ascites, no evidence of blood flow into the mass.  Outside hospital had spoken to gynecology and general surgery and had been recommended that they get CA 19-9, when this came back elevated they have consulted with our surgical oncologist who accepted the patient for transfer.  She arrives with some pain and discomfort, lower in the abdomen, she says that her pain was severe, had gotten substantially improved with placement of the Zepeda catheter, she continues to have some fullness.  She is not having any further nausea or vomiting, she had received Dilaudid and Zofran prior to transfer.    PAST MEDICAL HISTORY   DLD,HTN    SURGICAL HISTORY  patient denies any surgical history    FAMILY HISTORY  History reviewed. No pertinent family history.    SOCIAL HISTORY  Social History     Tobacco Use    Smoking status: Never    Smokeless tobacco: Never   Vaping Use    Vaping status: Never Used   Substance and Sexual Activity    Alcohol use: Yes     Comment: occasionally    Drug use: Never    Sexual activity: Not on file       CURRENT MEDICATIONS  Home Medications       Reviewed by Rani Mac (Pharmacy Tech) on 03/03/25 at 2359  Med List Status: Complete     Medication Last Dose Status   levothyroxine (SYNTHROID) 88 MCG Tab 3/1/2025 Active   montelukast (SINGULAIR) 10 MG Tab 3/1/2025 Active   simvastatin (ZOCOR) 40 MG Tab 3/1/2025 Active   traZODone (DESYREL) 150 MG Tab 3/1/2025 Active                    ALLERGIES  Allergies   Allergen Reactions    Peanut-Derived Anaphylaxis    Shellfish-Derived Products Anaphylaxis    Amitriptyline Itching     Doesn't work    Carbamazepine Itching     Doesn't work    Carisoprodol Itching     Doesn't work    Diclofenac Itching     Doesn't work    Hydrocodone Itching     Doesn't work    Metoclopramide Itching     Doesn't work    Nortriptyline Itching     Doesn't work    Nsaids Unspecified     GI upset    Oxycodone Itching     Makes me sick       PHYSICAL  "EXAM  VITAL SIGNS: BP (!) 147/65   Pulse (!) 110   Temp 36.9 °C (98.4 °F) (Temporal)   Resp 15   Ht 1.651 m (5' 5\")   Wt 70.3 kg (155 lb)   SpO2 89%   BMI 25.79 kg/m²    Genl: F sitting in gurney uncomfortably, speaking clearly, appears in very mild distress   Head: NC/AT   ENT: Mucous membranes slightly dry, posterior pharynx clear, uvula midline, nares patent bilaterally   Eyes: Normal sclera, pupils equal round reactive to light  Neck: Supple, FROM, no LAD appreciated   Pulmonary: Lungs are clear to auscultation bilaterally  Chest: No TTP  CV:  tachycardia, no murmur appreciated, pulses 2+ in both upper and lower extremities,  Abdomen: soft, left lower quadrant, suprapubic and right lower quadrant discomfort.  Upper abdomen is soft, no pain with bilateral leg raise. ND; held guarding without rebound.  Fullness is appreciated throughout the lower abdomen with appreciable mass.  No obvious organomegaly.    : no CVA tenderness   Musculoskeletal: Pain free ROM of the neck. Moving upper and lower extremities in spontaneous and coordinated fashion  Neuro: A&Ox4 (person, place, time, situation), speech fluent, gait not assessed, no focal deficits appreciated, No cerebellar signs. Sensation is grossly intact in the distal upper and lower extremities.  5/5 strength in  and dorsiflexion/plantar flexion of the ankles  Psych: Patient has an appropriate affect and behavior  Skin: No rash or lesions.  No pallor or jaundice.  No cyanosis.  Warm and dry.     EKG/LABS  Results for orders placed or performed during the hospital encounter of 25   EKG   Result Value Ref Range    Report       [facility] Emergency Dept.    Test Date:  2025  Pt Name:    AKILA LOYOLA                Department: ER  MRN:        [id number]                      Room:       RD 04  Gender:     Female                       Technician: [id number]  :        1945                   Requested By:AHMET ALVA  Order #: "    [id number]                    Reading MD: Pato Donovan MD    Measurements  Intervals                                Axis  Rate:       107                          P:          93  RI:         161                          QRS:        87  QRSD:       77                           T:          -30  QT:         329  QTc:        439    Interpretive Statements  Sinus tachycardia, rate of 107, normal intervals and axis, no acute ischemia  or infarction.  Borderline T abnormalities, inferior leads  No previous ECG available for comparison  Electronically Signed On 03- 23:59:58 PST by Pato Donovan MD     I have independently interpreted this EKG    Labs Reviewed   CBC WITH DIFFERENTIAL - Abnormal; Notable for the following components:       Result Value    WBC 22.0 (*)     Neutrophils-Polys 73.30 (*)     Lymphocytes 17.20 (*)     Neutrophils (Absolute) 16.13 (*)     Monos (Absolute) 2.09 (*)     All other components within normal limits   COMP METABOLIC PANEL - Abnormal; Notable for the following components:    Glucose 129 (*)     All other components within normal limits   PROTHROMBIN TIME - Abnormal; Notable for the following components:    PT 16.4 (*)     INR 1.32 (*)     All other components within normal limits   COD (ADULT) - Abnormal; Notable for the following components:    Antibody Screen-Cod POS (*)     All other components within normal limits   LACTIC ACID   CREATINE KINASE   APTT   ESTIMATED GFR   DIFFERENTIAL MANUAL   PERIPHERAL SMEAR REVIEW   PLATELET ESTIMATE   MORPHOLOGY   RBC ANTIGEN TYPING, C (HR')   BLOOD CULTURE   BLOOD CULTURE   ANTIBODY IDENTIFICATION     RADIOLOGY/PROCEDURES   I have independently interpreted the diagnostic imaging associated with this visit and am waiting the final reading from the radiologist.   My preliminary interpretation is as follows: Patient had solid mass in the pelvis with some fluid, no evidence of blood flow to the structure.    Radiologist  interpretation:  [id number] FILM ULTRASOUND   Final Result        COURSE & MEDICAL DECISION MAKING    ASSESSMENT, COURSE AND PLAN  Care Narrative: Evaluated for symptoms as described above.  The patient presents with some overall improvement in her pain though she still has some persistent fullness in her lower abdomen.  [facility], [facility], torsion however does appear that with elevated CA 19-9, leukocytosis and some tachycardia could be ongoing septic etiology, intra-abdominal malignancy complicating things.  Her outside imaging did not show evidence of free air.  She has some continued pressure and discomfort though relieved with a Zepeda catheter placed prior to transfer and with no evidence of abdominal rigidity at this time.  Pain is managed with intermittent pain medications, she had already received empiric antibiosis and blood cultures are pending at the [facility] facility.  Without full confirmation that this would be acute infectious findings.  Possible this could be premalignant ascites and I have contacted Dr. Magana who is aware the patient had excepted the transfer.    Lab work is currently being repeated though had been reviewed at the [facility] and he is aware of the prior workup and the positive CA 19-9.  Do not see any evidence of peritonitis.  She does have a leukocytosis, she does not have hemodynamic instability and appears to be fluid responsive at this time.  Serial exams are performed, at his discretion for planned surgical intervention in the morning I have given her a dose of Zosyn, started her on fluid infusion, holding medications and pain medications are going, she is n.p.o. at this time and he plans on seeing the patient early this morning for likely surgical intervention.    While the patient has a leukocytosis of 22, there is no bandemia at this time despite the leftward shift, she has a stable H&H, no coagulopathy, no SVETA and lactate is  1.3.    Will be admitted to Dr. Magana surgical service in guarded condition.  I have updated the family members and patient, admitted in guarded condition.    Hydration: Based on the patient's presentation of Dehydration, Sepsis, and Tachycardia the patient was given IV fluids. IV Hydration was used because oral hydration was not adequate alone. Upon recheck following hydration, the patient was improving.  Sepsis: Infection was suspected 2315 (Time). Sepsis pathway was initiated. Fluids not needed (no hypotension or lactate greater than or equal to 4). Antibiotics were given per protocol.    FINAL DIAGNOSIS  1. SIRS (systemic inflammatory response syndrome) (HCC)    2. Abdominal mass, unspecified abdominal location    3. Lower abdominal pain      Electronically signed by: Zion Whyte M.D., 3/3/2025 11:07 PM

## 2025-03-04 NOTE — ANESTHESIA PREPROCEDURE EVALUATION
Case: [id number] Date/Time: 03/04/25 1518    Procedures:       ROBOTIC BILATERAL SALPINGECTOMY (Bilateral)      OMENTECTOMY    Anesthesia type: General    Location: [place] [address] / SURGERY [facility]    Surgeons: Johnson Magana M.D.          80 yo F with history of asthma, HLD and hypothyroidism admitted for abdominal pain and nausea. Currently on 1L O2 NC. No current CP/SOB/N/V symptoms.     NPO  Relevant Problems   ANESTHESIA   (negative) History of anesthesia complications      PULMONARY   (positive) Asthma      Other   (positive) Abdominal mass, right lower quadrant       Physical Exam    Airway   Mallampati: II  TM distance: >3 FB  Neck ROM: full       Cardiovascular - normal exam  Rhythm: regular  Rate: normal  (-) murmur     Dental - normal exam           Pulmonary - normal exam  Breath sounds clear to auscultation     Abdominal    Neurological - normal exam                   Anesthesia Plan    ASA 2       Plan - general       Airway plan will be ETT          Induction: intravenous    Postoperative Plan: Postoperative administration of opioids is intended.    Pertinent diagnostic labs and testing reviewed    Informed Consent:    Anesthetic plan and risks discussed with patient.    Use of blood products discussed with: patient whom consented to blood products.

## 2025-03-04 NOTE — ED TRIAGE NOTES
Chief Complaint   Patient presents with    Abdominal Pain     Right lower quadrant pain radiating across the abdomen x 4 days, pt is transfer from [facility] for OBGYN consult.        Pt stated she also had abdominal distension and visited josh Perez, pt CT abdomen outside the facility showed solid cystic Mass and is transfer to [facility] for OBGYN consultation.     Pt received dilaudid 1.5 total, 1 liter NS, and rocephin 2 gram.     Pt AAOx4, GCS 15, connected to the monitor, EKG performed. Pt received with wetzel catheter.     BP (!) 147/65   Pulse (!) 110   Temp 36.9 °C (98.4 °F) (Temporal)   Resp 15   SpO2 89%

## 2025-03-04 NOTE — CARE PLAN
The patient is Stable - Low risk of patient condition declining or worsening    Shift Goals  Clinical Goals: PRS 4/10 or less by end of shift. Patient will not have nausea/vomiting  Patient Goals: Pain control and to meet with Dr. Magana  Family Goals: Not present    Progress made toward(s) clinical / shift goals:      Problem: Pain - Standard  Goal: Alleviation of pain or a reduction in pain to the patient’s comfort goal  Outcome: Progressing  Flowsheets (Taken 3/4/2025 1407)  Pain Rating Scale (NPRS): 6  Note: PRS and interventions available discussed with patient. Medicated per order and patient request. Patient tolerating well. Nonpharmacologic interventions offered.     Problem: Fall Risk  Goal: Patient will remain free from falls  Outcome: Progressing  Note: Patient remains free from injury and falls this shift. Fall precautions in place.      Problem: Knowledge Deficit - Standard  Goal: Patient and family/care givers will demonstrate understanding of plan of care, disease process/condition, diagnostic tests and medications  Outcome: Progressing  Note: Discussed POC and shift goals with patient, family at bedside and primary team. All questions answered as able. Updated on NPO statues throughout shift. Gyn Onc to bedside to discuss procedure       Patient is not progressing towards the following goals:      Problem: Bowel Elimination  Goal: Establish and maintain regular bowel function  Outcome: Not Progressing  Note: No BM this shift, bowel sounds present. Abdomen is distended and tender, patient NPO going for procedure this shift

## 2025-03-04 NOTE — H&P
Gynecologic Oncology H&P    Date: 3/4/2025    Admitting Physician: Mckenna Bunch P.A.-C.     CC: Abdominal Pain    History and HPI: This is a 79 y.o. female with PMHx of hypothyroidism, insomnia, asthma, and HLD, PSHx of reported hysterectomy/BSO, appendectomy, and cholecystectomy who presented to [facility] ED with CC of abdominal pain and nausea. Initial workup included CT scan that showed a large mass in the lower abdomen. Ultrasound was obtained that showed a mixed cystic solid mass of the right of midline measuring 6.4 x 4.7 x 5.6 cm. This appears to have no internal blood flow and the concern was for possible torsion versus more likely pelvic mass/malignancy.  908.3, CEA wnl.     Pt reports hysterectomy/BSO was 2/2 miscarriage at age 26 and that they removed tubes and ovaries for sure. She denies any abnormal pap smears or history of other cancers. She did endorse being on oral HRT for years after her hysterectomy and experienced hot flashes.She was in her normal state of health until 5 days ago when she developed nausea without vomiting, abdominal distension, constipation, and urinary urgency. She denies any urinary changes prior, any abd pain prior, or any changes in her bowel habits prior to 5 days ago.        Review of Systems   Constitutional:  Positive for fever. Negative for chills, malaise/fatigue and weight loss.   Respiratory:  Negative for cough.    Cardiovascular:  Negative for chest pain, palpitations, claudication and leg swelling.   Gastrointestinal:  Positive for abdominal pain, constipation, nausea and vomiting. Negative for blood in stool, diarrhea and heartburn.   Genitourinary:  Negative for dysuria, frequency, hematuria and urgency.   Musculoskeletal:  Negative for myalgias.   Neurological:  Negative for dizziness, weakness and headaches.   Psychiatric/Behavioral:  The patient is not nervous/anxious.        OB/GYN History:   [id number]  Last pap unknown, no hx of abnormal per  pt    Current Facility-Administered Medications   Medication Dose Route Frequency Provider Last Rate Last Admin    HYDROmorphone (Dilaudid) injection 1 mg  1 mg Intravenous Q HOUR PRN Zion Whyte M.D.   1 mg at 03/04/25 0722       Allergies:  Peanut-derived, Shellfish-derived products, Amitriptyline, Carbamazepine, Carisoprodol, Diclofenac, Hydrocodone, Metoclopramide, Nortriptyline, Nsaids, and Oxycodone    Social History     Socioeconomic History    Marital status:      Spouse name: Not on file    Number of children: Not on file    Years of education: Not on file    Highest education level: Not on file   Occupational History    Not on file   Tobacco Use    Smoking status: Never    Smokeless tobacco: Never   Vaping Use    Vaping status: Never Used   Substance and Sexual Activity    Alcohol use: Yes     Comment: occasionally    Drug use: Never    Sexual activity: Not on file   Other Topics Concern    Not on file   Social History Narrative    Not on file     Social Drivers of Health     Financial Resource Strain: Not on file   Food Insecurity: Food Insecurity Present (3/4/2025)    Hunger Vital Sign     Worried About Running Out of Food in the Last Year: Sometimes true     Ran Out of Food in the Last Year: Sometimes true   Transportation Needs: No Transportation Needs (3/4/2025)    PRAPARE - Transportation     Lack of Transportation (Medical): No     Lack of Transportation (Non-Medical): No   Physical Activity: Not on file   Stress: Not on file   Social Connections: Not on file   Intimate Partner Violence: Not At Risk (3/4/2025)    Humiliation, Afraid, Rape, and Kick questionnaire     Fear of Current or Ex-Partner: No     Emotionally Abused: No     Physically Abused: No     Sexually Abused: No   Housing Stability: Low Risk  (3/4/2025)    Housing Stability Vital Sign     Unable to Pay for Housing in the Last Year: No     Number of Times Moved in the Last Year: 0     Homeless in the Last Year: No     Family  "History:  Paternal uncle: Cancer (unknown type)    Physical Exam:  BP (!) 140/63   Pulse (!) 113   Temp 37.3 °C (99.2 °F) (Temporal)   Resp 16   Ht 1.651 m (5' 5\")   Wt 74.6 kg (164 lb 7.4 oz)   SpO2 94%     Physical Exam  Constitutional:       General: She is not in acute distress.     Appearance: She is not ill-appearing.   HENT:      Head: Normocephalic.      Mouth/Throat:      Mouth: Mucous membranes are moist.      Pharynx: Oropharynx is clear.   Eyes:      Extraocular Movements: Extraocular movements intact.   Cardiovascular:      Rate and Rhythm: Regular rhythm. Tachycardia present.      Pulses: Normal pulses.   Pulmonary:      Effort: Pulmonary effort is normal.      Breath sounds: No wheezing.   Abdominal:      General: There is distension.      Tenderness: There is abdominal tenderness.      Comments: firm   Genitourinary:     General: Normal vulva.      Comments: Vaginal tissue pliable without masses, induration, or nodularity. Unable to palpate mass   Musculoskeletal:         General: Normal range of motion.   Skin:     General: Skin is warm and dry.      Capillary Refill: Capillary refill takes less than 2 seconds.   Neurological:      Mental Status: She is alert.   Psychiatric:         Mood and Affect: Mood normal.          Labs:  Recent Labs     03/03/25  1437 03/03/25  2316   WBC  --  22.0*   RBC 4.81 4.50   HEMOGLOBIN 14.9 13.9   HEMATOCRIT 43.2 41.2   MCV 89.9 91.6   MCH 31 30.9   MCHC 34.5 33.7   RDW 12.3 40.6   PLATELETCT 321 272   MPV 8.5 9.8     Recent Labs     03/03/25  2316   SODIUM 135   POTASSIUM 3.6   CHLORIDE 98   CO2 24   GLUCOSE 129*   BUN 8   CREATININE 0.66   CALCIUM 8.6     Recent Labs     03/03/25  2316   APTT 30.5   INR 1.32*     Recent Labs     03/03/25  2316   ASTSGOT 34   ALTSGPT 21   TBILIRUBIN 0.7   ALKPHOSPHAT 78   GLOBULIN 3.4   INR 1.32*       Radiology:  CT scan at [facility] showed a large mass in the lower abdomen.  TVUS: mixed cystic solid mass of the right of midline " measuring 6.4 x 4.7 x 5.6 cm    Assessment: This is a 79 y.o. female who presents with abdominal pain, leukocytosis, and new finding of pelvic mass. Ddx includes ovarian cancer, benign ovarian cyst, colon cancer, or infectious process.     Plan:   #Mixed cystic solid mass: 6.4 x 4.7 x 5.6 cm  -Plan for RA exploratory laparoscopy, excision of mass today with Dr. Magana    #Leukocytsosis: WBCs to 22 today, infectious process vs reactive  -On Zosyn  -CTM    #Abdominal pain: likely 2/2 cystic mass  -Dilaudid prn controlling pain  -Consider transition to oral pain medication pending surgical outcome    #History of hypothyroidism  -Continue home synthroid       Mckenna Bunch PA-C  Gynecologic Oncology  [facility]

## 2025-03-04 NOTE — PROGRESS NOTES
4 Eyes Skin Assessment Completed by ANDRE Mario and ANDRE Teran.    Head WDL  Ears WDL  Nose WDL  Mouth WDL  Neck WDL  Breast/Chest WDL  Shoulder Blades WDL  Spine Redness and Blanching  (R) Arm/Elbow/Hand Redness and Blanching  (L) Arm/Elbow/Hand Redness and Blanching  Abdomen WDL  Groin WDL  Scrotum/Coccyx/Buttocks WDL  (R) Leg WDL  (L) Leg WDL  (R) Heel/Foot/Toe WDL  (L) Heel/Foot/Toe WDL          Devices In Places Blood Pressure Cuff, Pulse Ox, and Nasal Cannula      Interventions In Place NC W/Ear Foams and Pillows    Possible Skin Injury No    Pictures Uploaded Into Epic N/A  Wound Consult Placed N/A  RN Wound Prevention Protocol Ordered No

## 2025-03-05 LAB
ANION GAP SERPL CALC-SCNC: 11 MMOL/L (ref 7–16)
BASOPHILS # BLD AUTO: 0.2 % (ref 0–1.8)
BASOPHILS # BLD: 0.04 K/UL (ref 0–0.12)
BUN SERPL-MCNC: 12 MG/DL (ref 8–22)
CALCIUM SERPL-MCNC: 8.1 MG/DL (ref 8.5–10.5)
CHLORIDE SERPL-SCNC: 96 MMOL/L (ref 96–112)
CO2 SERPL-SCNC: 24 MMOL/L (ref 20–33)
CREAT SERPL-MCNC: 0.54 MG/DL (ref 0.5–1.4)
EOSINOPHIL # BLD AUTO: 0.02 K/UL (ref 0–0.51)
EOSINOPHIL NFR BLD: 0.1 % (ref 0–6.9)
ERYTHROCYTE [DISTWIDTH] IN BLOOD BY AUTOMATED COUNT: 40.6 FL (ref 35.9–50)
GFR SERPLBLD CREATININE-BSD FMLA CKD-EPI: 93 ML/MIN/1.73 M 2
GLUCOSE SERPL-MCNC: 181 MG/DL (ref 65–99)
HCT VFR BLD AUTO: 34.3 % (ref 37–47)
HGB BLD-MCNC: 11.5 G/DL (ref 12–16)
IMM GRANULOCYTES # BLD AUTO: 0.19 K/UL (ref 0–0.11)
IMM GRANULOCYTES NFR BLD AUTO: 0.8 % (ref 0–0.9)
LYMPHOCYTES # BLD AUTO: 0.89 K/UL (ref 1–4.8)
LYMPHOCYTES NFR BLD: 3.9 % (ref 22–41)
MCH RBC QN AUTO: 30.5 PG (ref 27–33)
MCHC RBC AUTO-ENTMCNC: 33.5 G/DL (ref 32.2–35.5)
MCV RBC AUTO: 91 FL (ref 81.4–97.8)
MONOCYTES # BLD AUTO: 1.45 K/UL (ref 0–0.85)
MONOCYTES NFR BLD AUTO: 6.4 % (ref 0–13.4)
NEUTROPHILS # BLD AUTO: 20.22 K/UL (ref 1.82–7.42)
NEUTROPHILS NFR BLD: 88.6 % (ref 44–72)
NRBC # BLD AUTO: 0 K/UL
NRBC BLD-RTO: 0 /100 WBC (ref 0–0.2)
PATHOLOGY CONSULT NOTE: NORMAL
PLATELET # BLD AUTO: 278 K/UL (ref 164–446)
PMV BLD AUTO: 10.1 FL (ref 9–12.9)
POTASSIUM SERPL-SCNC: 3.5 MMOL/L (ref 3.6–5.5)
RBC # BLD AUTO: 3.77 M/UL (ref 4.2–5.4)
SODIUM SERPL-SCNC: 131 MMOL/L (ref 135–145)
WBC # BLD AUTO: 22.8 K/UL (ref 4.8–10.8)

## 2025-03-05 PROCEDURE — A9270 NON-COVERED ITEM OR SERVICE: HCPCS | Performed by: STUDENT IN AN ORGANIZED HEALTH CARE EDUCATION/TRAINING PROGRAM

## 2025-03-05 PROCEDURE — 700102 HCHG RX REV CODE 250 W/ 637 OVERRIDE(OP): Performed by: STUDENT IN AN ORGANIZED HEALTH CARE EDUCATION/TRAINING PROGRAM

## 2025-03-05 PROCEDURE — 700111 HCHG RX REV CODE 636 W/ 250 OVERRIDE (IP): Mod: TB | Performed by: STUDENT IN AN ORGANIZED HEALTH CARE EDUCATION/TRAINING PROGRAM

## 2025-03-05 PROCEDURE — 85025 COMPLETE CBC W/AUTO DIFF WBC: CPT

## 2025-03-05 PROCEDURE — 700102 HCHG RX REV CODE 250 W/ 637 OVERRIDE(OP)

## 2025-03-05 PROCEDURE — A9270 NON-COVERED ITEM OR SERVICE: HCPCS

## 2025-03-05 PROCEDURE — 36415 COLL VENOUS BLD VENIPUNCTURE: CPT

## 2025-03-05 PROCEDURE — 770004 HCHG ROOM/CARE - ONCOLOGY PRIVATE *

## 2025-03-05 PROCEDURE — 80048 BASIC METABOLIC PNL TOTAL CA: CPT

## 2025-03-05 RX ORDER — MONTELUKAST SODIUM 10 MG/1
10 TABLET ORAL
Status: DISCONTINUED | OUTPATIENT
Start: 2025-03-05 | End: 2025-03-07 | Stop reason: HOSPADM

## 2025-03-05 RX ORDER — OMEPRAZOLE 20 MG/1
20 CAPSULE, DELAYED RELEASE ORAL DAILY
Status: DISCONTINUED | OUTPATIENT
Start: 2025-03-05 | End: 2025-03-07 | Stop reason: HOSPADM

## 2025-03-05 RX ORDER — HYDROMORPHONE HYDROCHLORIDE 1 MG/ML
1 INJECTION, SOLUTION INTRAMUSCULAR; INTRAVENOUS; SUBCUTANEOUS
Status: DISCONTINUED | OUTPATIENT
Start: 2025-03-05 | End: 2025-03-07 | Stop reason: HOSPADM

## 2025-03-05 RX ORDER — AMOXICILLIN 250 MG
2 CAPSULE ORAL EVERY EVENING
Status: DISCONTINUED | OUTPATIENT
Start: 2025-03-05 | End: 2025-03-07 | Stop reason: HOSPADM

## 2025-03-05 RX ORDER — KETOROLAC TROMETHAMINE 15 MG/ML
15 INJECTION, SOLUTION INTRAMUSCULAR; INTRAVENOUS EVERY 8 HOURS
Status: DISCONTINUED | OUTPATIENT
Start: 2025-03-05 | End: 2025-03-07 | Stop reason: HOSPADM

## 2025-03-05 RX ORDER — POTASSIUM CHLORIDE 1500 MG/1
20 TABLET, EXTENDED RELEASE ORAL DAILY
Status: DISCONTINUED | OUTPATIENT
Start: 2025-03-05 | End: 2025-03-07 | Stop reason: HOSPADM

## 2025-03-05 RX ORDER — SIMVASTATIN 40 MG
40 TABLET ORAL NIGHTLY
Status: DISCONTINUED | OUTPATIENT
Start: 2025-03-05 | End: 2025-03-07 | Stop reason: HOSPADM

## 2025-03-05 RX ORDER — TRAZODONE HYDROCHLORIDE 100 MG/1
150 TABLET ORAL NIGHTLY
Status: DISCONTINUED | OUTPATIENT
Start: 2025-03-05 | End: 2025-03-07 | Stop reason: HOSPADM

## 2025-03-05 RX ORDER — ACETAMINOPHEN 500 MG
1000 TABLET ORAL EVERY 8 HOURS
Status: DISCONTINUED | OUTPATIENT
Start: 2025-03-05 | End: 2025-03-07 | Stop reason: HOSPADM

## 2025-03-05 RX ORDER — LEVOTHYROXINE SODIUM 88 UG/1
88 TABLET ORAL
Status: DISCONTINUED | OUTPATIENT
Start: 2025-03-05 | End: 2025-03-07 | Stop reason: HOSPADM

## 2025-03-05 RX ORDER — POLYETHYLENE GLYCOL 3350 17 G/17G
1 POWDER, FOR SOLUTION ORAL
Status: DISCONTINUED | OUTPATIENT
Start: 2025-03-05 | End: 2025-03-07 | Stop reason: HOSPADM

## 2025-03-05 RX ADMIN — MONTELUKAST 10 MG: 10 TABLET, FILM COATED ORAL at 20:12

## 2025-03-05 RX ADMIN — TRAZODONE HYDROCHLORIDE 150 MG: 100 TABLET ORAL at 20:12

## 2025-03-05 RX ADMIN — KETOROLAC TROMETHAMINE 15 MG: 15 INJECTION, SOLUTION INTRAMUSCULAR; INTRAVENOUS at 22:16

## 2025-03-05 RX ADMIN — ACETAMINOPHEN 1000 MG: 500 TABLET ORAL at 10:47

## 2025-03-05 RX ADMIN — ACETAMINOPHEN 1000 MG: 500 TABLET ORAL at 17:46

## 2025-03-05 RX ADMIN — SENNOSIDES AND DOCUSATE SODIUM 2 TABLET: 50; 8.6 TABLET ORAL at 17:45

## 2025-03-05 RX ADMIN — HYDROMORPHONE HYDROCHLORIDE 2 MG: 4 TABLET ORAL at 20:22

## 2025-03-05 RX ADMIN — POTASSIUM CHLORIDE 20 MEQ: 1500 TABLET, EXTENDED RELEASE ORAL at 13:07

## 2025-03-05 RX ADMIN — HYDROMORPHONE HYDROCHLORIDE 2 MG: 4 TABLET ORAL at 05:33

## 2025-03-05 RX ADMIN — KETOROLAC TROMETHAMINE 15 MG: 15 INJECTION, SOLUTION INTRAMUSCULAR; INTRAVENOUS at 13:07

## 2025-03-05 RX ADMIN — SIMVASTATIN 40 MG: 40 TABLET, FILM COATED ORAL at 20:12

## 2025-03-05 RX ADMIN — LEVOTHYROXINE SODIUM 88 MCG: 0.09 TABLET ORAL at 10:47

## 2025-03-05 RX ADMIN — HYDROMORPHONE HYDROCHLORIDE 1 MG: 1 INJECTION, SOLUTION INTRAMUSCULAR; INTRAVENOUS; SUBCUTANEOUS at 10:47

## 2025-03-05 RX ADMIN — HYDROMORPHONE HYDROCHLORIDE 2 MG: 4 TABLET ORAL at 08:46

## 2025-03-05 RX ADMIN — OMEPRAZOLE 20 MG: 20 CAPSULE, DELAYED RELEASE ORAL at 13:07

## 2025-03-05 ASSESSMENT — ENCOUNTER SYMPTOMS
VOMITING: 0
ABDOMINAL PAIN: 1
FEVER: 0
CHILLS: 0
MYALGIAS: 0
COUGH: 0
DIZZINESS: 0
NAUSEA: 1
SHORTNESS OF BREATH: 0

## 2025-03-05 ASSESSMENT — COGNITIVE AND FUNCTIONAL STATUS - GENERAL
MOVING TO AND FROM BED TO CHAIR: A LITTLE
SUGGESTED CMS G CODE MODIFIER DAILY ACTIVITY: CJ
MOBILITY SCORE: 18
TURNING FROM BACK TO SIDE WHILE IN FLAT BAD: A LITTLE
WALKING IN HOSPITAL ROOM: A LITTLE
CLIMB 3 TO 5 STEPS WITH RAILING: A LITTLE
DAILY ACTIVITIY SCORE: 20
STANDING UP FROM CHAIR USING ARMS: A LITTLE
DRESSING REGULAR UPPER BODY CLOTHING: A LITTLE
SUGGESTED CMS G CODE MODIFIER MOBILITY: CK
MOVING FROM LYING ON BACK TO SITTING ON SIDE OF FLAT BED: A LITTLE
DRESSING REGULAR LOWER BODY CLOTHING: A LITTLE
HELP NEEDED FOR BATHING: A LITTLE
TOILETING: A LITTLE

## 2025-03-05 ASSESSMENT — PAIN DESCRIPTION - PAIN TYPE
TYPE: ACUTE PAIN
TYPE: ACUTE PAIN;SURGICAL PAIN
TYPE: ACUTE PAIN;SURGICAL PAIN
TYPE: ACUTE PAIN
TYPE: ACUTE PAIN;SURGICAL PAIN
TYPE: ACUTE PAIN
TYPE: ACUTE PAIN

## 2025-03-05 ASSESSMENT — PAIN SCALES - GENERAL: PAIN_LEVEL: 6

## 2025-03-05 NOTE — CARE PLAN
The patient is Watcher - Medium risk of patient condition declining or worsening    Shift Goals  Clinical Goals: Patient will verbalize tolerable pain level throughout shift  Patient Goals: Rest and pain/nausea control  Family Goals: updates    Progress made toward(s) clinical / shift goals:    Problem: Pain - Standard  Goal: Alleviation of pain or a reduction in pain to the patient’s comfort goal  Outcome: Progressing     Problem: Fall Risk  Goal: Patient will remain free from falls  Outcome: Progressing     Problem: Knowledge Deficit - Standard  Goal: Patient and family/care givers will demonstrate understanding of plan of care, disease process/condition, diagnostic tests and medications  Outcome: Progressing  Note: Pt is AO4. Call light within reach. Educated and understand POC. All questions answered at this time.       Patient is not progressing towards the following goals:

## 2025-03-05 NOTE — OR SURGEON
Immediate Post OP Note    PreOp Diagnosis: Abdominal pain   10 cm complex pelvic mass   908      PostOp Diagnosis: Stage II ovaca grade 3   Optimal cytoreductive surgery with R0 resection  Torsion of the right ovary    Procedure(s):  ROBOTIC ASSISTED RIGHT SALPINGOOPHORECTOMY - Wound Class: Clean Contaminated  OMENTECTOMY - Wound Class: Clean Contaminated    Surgeon(s):  Johnson Magana M.D.    Anesthesiologist/Type of Anesthesia:  Anesthesiologist: Shine Oh M.D.; Jeronimo Ratliff M.D.; Jennifer Reich M.D./General    Surgical Staff:  Circulator: Robson Newell R.N.  Relief Circulator: Carmella Woodard R.N.  Scrub Person: Kylie Stroud; Karla Albright  First Assist: Mckenna Bunch P.A.-C.    Specimens removed if any:  ID Type Source Tests Collected by Time Destination   A : right tube and ovary Tissue Ovary PATHOLOGY SPECIMEN Johnson Magana M.D. 3/4/2025  6:01 PM    B : Omentum Tissue Abdominal PATHOLOGY SPECIMEN Johnson Magana M.D. 3/4/2025  6:21 PM        Estimated Blood Loss: 50 cc    Findings: There was some pelvic fluid that was noted no obvious ascites appreciated.  Normal right left diaphragm liver capsule smooth omentum appeared grossly normal.  Small bowel mesentery and serosa of the small bowel was unremarkable.  Abdominal peritoneal surfaces were unremarkable.    In the pelvis uterus was absent.  There was an enlarged ovary emanating from the right ovary that had torsed there was necrotic and bled into the mass.  There was no evidence of excrescences.  Left ovary was absent.  There was no seeding along the bladder pelvic and cul-de-sac peritoneum.  The sigmoid colon was adherent to the left pelvic sidewall which I had to take down the adhesions to evaluate whether the left ovary was present on.  After taking down the adhesions and mobilizing the sigmoid colon off the pelvic sidewall we were able to visualize the left adnexal fossa there was no evidence of ovary.  The left ureter was  identified.    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    Complications: None        3/4/2025 6:54 PM Johnson Magana M.D.

## 2025-03-05 NOTE — ANESTHESIA PROCEDURE NOTES
Peripheral IV    Date/Time: 3/4/2025 5:00 PM    Performed by: Jennifer Reich M.D.  Authorized by: Jennifer Reich M.D.    Size:  20 G  Laterality:  Left  Peripheral IV Location:  Hand  Local Anesthetic:  None  Site Prep:  Alcohol  Technique:  Direct puncture  Attempts:  1

## 2025-03-05 NOTE — ANESTHESIA PROCEDURE NOTES
Airway    Date/Time: 3/4/2025 4:45 PM    Performed by: Jennifer Reich M.D.  Authorized by: Jennifer Reich M.D.    Location:  OR  Urgency:  Elective  Difficult Airway: No    Indications for Airway Management:  Anesthesia      Spontaneous Ventilation: absent    Sedation Level:  Deep  Preoxygenated: Yes    Patient Position:  Sniffing  Mask Difficulty Assessment:  1 - vent by mask  Final Airway Type:  Endotracheal airway  Final Endotracheal Airway:  ETT  Cuffed: Yes    Technique Used for Successful ETT Placement:  Direct laryngoscopy  Devices/Methods Used in Placement:  Anterior pressure/BURP and intubating stylet    Insertion Site:  Oral  Blade Type:  Martha  Laryngoscope Blade/Videolaryngoscope Blade Size:  3  ETT Size (mm):  6.5  Measured from:  Teeth  ETT to Teeth (cm):  21  Placement Verified by: auscultation and capnometry    Cormack-Lehane Classification:  Grade IIa - partial view of glottis  Number of Attempts at Approach:  1

## 2025-03-05 NOTE — ANESTHESIA TIME REPORT
Anesthesia Start and Stop Event Times       Date Time Event    3/4/2025 1539 Ready for Procedure     1635 Anesthesia Start     1903 Anesthesia Stop          Responsible Staff  03/04/25      Name Role Begin End    Jennifer Reich M.D. Anesth 1635 1707    Jeronimo Ratliff M.D. Anesth 1707 1746    Shine Oh M.D. Anesth 1746 1903          Overtime Reason:  no overtime (within assigned shift)    Comments:

## 2025-03-05 NOTE — ANESTHESIA POSTPROCEDURE EVALUATION
Patient: Sujey Olson    Procedure Summary       Date: 03/04/25 Room / Location: [place] [address] / SURGERY [facility]    Anesthesia Start: 1635 Anesthesia Stop: 1903    Procedures:       ROBOTIC ASSISTED RIGHT SALPINGOOPHORECTOMY (Bilateral: Abdomen)      OMENTECTOMY (Abdomen) Diagnosis: (STAGE 2 OVARIAN CANCER)    Surgeons: Johnson Magana M.D. Responsible Provider: Shine Oh M.D.    Anesthesia Type: general ASA Status: 2            Final Anesthesia Type: general  Last vitals  BP   Blood Pressure : 129/63    Temp   36.5 °C (97.7 °F)    Pulse   (!) 104   Resp   18    SpO2   95 %      Anesthesia Post Evaluation    Patient location during evaluation: PACU  Patient participation: complete - patient participated  Level of consciousness: awake and alert  Pain score: 6    Airway patency: patent  Anesthetic complications: no  Cardiovascular status: hemodynamically stable  Respiratory status: acceptable  Hydration status: euvolemic    PONV: none          No notable events documented.

## 2025-03-05 NOTE — OP REPORT
PreOp Diagnosis: Abdominal pain   10 cm complex pelvic mass   908        PostOp Diagnosis: Stage II ovaca grade 3   Optimal cytoreductive surgery with R0 resection  Torsion of the right ovary     Procedure(s):  ROBOTIC ASSISTED RIGHT SALPINGOOPHORECTOMY - Wound Class: Clean Contaminated  OMENTECTOMY - Wound Class: Clean Contaminated     Surgeon(s):  Johnson Magana M.D.     Anesthesiologist/Type of Anesthesia:  Anesthesiologist: Shine Oh M.D.; Jeronimo Ratliff M.D.; Jennifer Reich M.D./General     Surgical Staff:  Circulator: Robson Newell R.N.  Relief Circulator: Carmella Woodard R.N.  Scrub Person: Kylie Stroud; Karla Albright  First Assist: Mckenna Bunch P.A.-C.     Specimens removed if any:  ID Type Source Tests Collected by Time Destination   A : right tube and ovary Tissue Ovary PATHOLOGY SPECIMEN Johnson Magana M.D. 3/4/2025  6:01 PM     B : Omentum Tissue Abdominal PATHOLOGY SPECIMEN Johnson Magana M.D. 3/4/2025  6:21 PM           Estimated Blood Loss: 50 cc     Findings: There was some pelvic fluid that was noted no obvious ascites appreciated.  Normal right left diaphragm liver capsule smooth omentum appeared grossly normal.  Small bowel mesentery and serosa of the small bowel was unremarkable.  Abdominal peritoneal surfaces were unremarkable.     In the pelvis uterus was absent.  There was an enlarged ovary emanating from the right ovary that had torsed there was necrotic and bled into the mass.  There was no evidence of excrescences.  Left ovary was absent.  There was no seeding along the bladder pelvic and cul-de-sac peritoneum.  The sigmoid colon was adherent to the left pelvic sidewall which I had to take down the adhesions to evaluate whether the left ovary was present on.  After taking down the adhesions and mobilizing the sigmoid colon off the pelvic sidewall we were able to visualize the left adnexal fossa there was no evidence of ovary.  The left ureter was identified.      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     Complications: None     Indication:  is a 79-year-old female who was transferred from [facility] last evening secondary to acute onset abdominal pain with nausea and vomiting.  She was found to have a 10 cm complex solid cystic mass.  She was also febrile and had elevated white blood cell count of 22,000.  There was a question of whether this was a torsion.  It was also concerning for possibly ovarian cancer.  Thus her care was subsequently transferred this morning to [facility].  Patient was seen by me for consultation.  CT scan was reviewed.  She has an elevated  of 908.  I suspect that she has a torsed ovary with ovarian cancer thus patient was advised undergo surgical pathological evaluation.  She was recommended to undergo robotic assisted oophorectomy and proceed as clinically indicated.  Should be noted that patient also has a previous history of hysterectomy.  Per the history patient states that she had both ovaries removed at the age of 26.  However intraoperatively we did note a remaining right ovary.  The left ovary was absent.  Thus she has been admitted to undergo robotic assisted resection of the pelvic mass.  Frozen section revealed a high-grade adenocarcinoma thus we proceeded on with the omentectomy.  We removed the infracolic omentum and portion of the gastrocolic omentum.  The splenic omentum was grossly normal thus we did not proceed with the removal of the splenic omentum.  This was mainly due to the fact that the port placement was placed in the upper abdomen and access to the splenic omentum was not possible.      Procedure: After achieving adequate anesthesia patient was prepped and draped in place modified dorsolithotomy position.  Surgical timeout was called after the surgical team agreed with proceeded.    Upper abdominal port placement configuration was undertaken  mainly because the mass was almost near to level of the xiphoid process.    A 1 cm supraumbilical umbilical incision was made.  A Veress needle was inserted without difficulty. Abdominal pressure was noted to be < 5mm Hg.  Pneumoperitoneum was achieved to the abdominal pressure of 15 mmHg.  A 8 mm trocar was inserted without difficulty.  After completion of this, a laparoscope was placed through this port and exploratory laparoscopy revealed the above findings.  Two 8 mm ports were placed in the right upper quadrant 8 cm apart while one 8 mm port was placed in the left upper quadrant 8 cm apart.  After completion of this, the patient was placed in steep Trendelenburg position.  The robotic system was then docked and after docking the robotic system, the instrumentation was inserted under direct laparoscopic visualization to insure that there was no injury to the abdominal contents.  Once this was completed, the robotic camera was then docked.  We then proceeded with our da Christa portion of the procedure.    Initial focus was turned towards the pelvic dissection.  The enlarged ovary was noted.  I opened up the right posterior broad ligament.  Right pararectal space was developed.  The right ureter as across the common iliac vessels was scheduled identified.  Ovarian cyst vessels were then subsequent skeletonized and isolated bipolar cautery was used to cauterize the ovarian vessels and subsequently divided.  I then incised the medial leaf of the peritoneum.  The adnexa was free of adhesions.  I was able to mobilize this and large adnexa without rupturing the cyst.  The mass was then mobilized up into the abdomen.  I then placed a sponge stick in the vaginal canal.  The apex of the vagina was demarcated with this sponge stick.  A colpotomy was made at the vaginal apex without compromising the bladder or the rectum.  This afforded me to deliver a 15 cm Endobag through the vaginal canal.  The enlarged mass was then  subsequently placed in the Endobag and sent block and delivered through the vagina.  I then turned my focus towards the perineal phase.    Long weighted speculum was placed in the posterior vagina.  The mass appears to be solid with soft tissue mass.  Initially we used a ring forceps to essentially piecemeal the mass within the Endobag without seeding the intraperitoneal cavity.  The specimen took us nearly about 20 minutes to remove after the specimen was removed within the Endobag was sent for frozen section.  Because of the solid nature of the mass I thought that she may have a granulosa cell tumor however final frozen section came back as a poorly differentiated adenocarcinoma of epithelial origin.  Thus we proceeded on with removal of the omentum.    The infracolic omentum was grasped and then delivered through the pelvis.  I then identified the right gastrohepatic ligament.  Using a spatula identified the transverse colon then the infracolic omentum junction and we then came across and remove the entire infracolic omentum off the transverse colon without compromising the transverse colon.  I was able to get access to the lesser sac.  This was in the midportion of the transverse colon I was then able to take down part of the gastrocolic omentum off the greater curvature of the stomach and the entire infracolic omentum was removed along with a good gastrocolic omentum.  I was not able to access the splenic omentum over the splenic omentum.  Grossly normal.  The omentum was then subsequently delivered through the vaginal canal without difficulty.  After completion of this I then turned my focus towards the left pelvic sidewall the sigmoid colon was adherent with diverticulum.  There was no evidence of diverticulitis.  We then took down the sigmoid colon off the pelvic sidewall mobilized the sigmoid colon reflected medially and gain access to the left adnexal fossa.  The left ureter was identified.  We did not see  "the left ovary.  After this was assured because of the significant dissection that had to be performed along the sigmoid colon I wanted to ensure that there was no evidence of compromise to the sigmoid colon.  Thus a 30 cc Zepeda catheter was placed in the rectum proximal portion of the sigmoid colon was clamped off.  I then insufflated with 500 cc of Betadine along the sigmoid rectum to the stent there was no evidence of extravasation thus assuring us that we did not compromise the sigmoid colon.  Once this was completed I then closed the vaginal cuff with \"PDS suture in a 2 layer standard fashion.  After completion of this we then copiously irrigated pelvis water hemostasis established once is established we counted for sponges needles instrument counts once this was established robotic instrumentation was removed robotic system was then undocked pneumoperitoneum was allowed to escape to the 8 mm port subcutaneous fat was copiously irrigated water skin was reapproximated with 3 Monocryl sutures.    At the conclusion of my procedure patient had an R0 resection with no other residual tumor remaining.    Patient tolerated procedure well without any difficulties was extubated and transferred to the PACU in stable condition.    "

## 2025-03-05 NOTE — PROGRESS NOTES
Gynecologic Oncology Progress Note    Author: Katie Fry P.A.-C.    Date/Time: 3/5/2025 9:04 AM    Date of Admit: 3/3/2025    HD#: 1 POD#: 1    Interval History:  No acute overnight events. Resting comfortably in bed this AM. States her pain is minimally controlled with current medication. Minimal nausea, no vomiting. Denies CP or SOB. Voiding spontaneously. Denies flatus.        Review of Systems   Constitutional:  Positive for malaise/fatigue. Negative for chills and fever.   Respiratory:  Negative for cough and shortness of breath.    Cardiovascular:  Negative for chest pain and leg swelling.   Gastrointestinal:  Positive for abdominal pain and nausea. Negative for vomiting.   Genitourinary:  Negative for dysuria, frequency and urgency.   Musculoskeletal:  Negative for myalgias.   Neurological:  Negative for dizziness.            Allergies   Allergen Reactions    Peanut-Derived Anaphylaxis    Shellfish-Derived Products Anaphylaxis    Amitriptyline Itching     Doesn't work    Carbamazepine Itching     Doesn't work    Carisoprodol Itching     Doesn't work    Diclofenac Itching     Doesn't work    Hydrocodone Itching     Doesn't work    Metoclopramide Itching     Doesn't work    Nortriptyline Itching     Doesn't work    Nsaids Unspecified     GI upset    Oxycodone Itching     Makes me sick            Current Facility-Administered Medications:     acetaminophen (Tylenol) tablet 1,000 mg, 1,000 mg, Oral, Q8HRS, BRIONNA JallohCZack    levothyroxine (Synthroid) tablet 88 mcg, 88 mcg, Oral, AM ES, Katie Fry P.A.-C.    montelukast (Singulair) tablet 10 mg, 10 mg, Oral, QHS, KING JallohAElisaC.    simvastatin (Zocor) tablet 40 mg, 40 mg, Oral, Nightly, Katie Fry P.A.-C.    traZODone (Desyrel) tablet 150 mg, 150 mg, Oral, Nightly, KING JallohAZack-C.    HYDROmorphone (Dilaudid) injection 1 mg, 1 mg, Intravenous, Q3HRS PRN, Katie Fry P.A.-C.    HYDROmorphone (Dilaudid) tablet  "2 mg, 2 mg, Oral, Q3HRS PRN, Mckenna Bunch P.A.-C., 2 mg at 03/05/25 0846    ondansetron (Zofran) syringe/vial injection 4 mg, 4 mg, Intravenous, Q4HRS PRN, Johnson Magana M.D., 4 mg at 03/04/25 2117       Objective:     /63   Pulse (!) 104   Temp 36.5 °C (97.7 °F) (Temporal)   Resp 18   Ht 1.651 m (5' 5\")   Wt 74.6 kg (164 lb 7.4 oz)   SpO2 95%     Physical Exam  Constitutional:       General: She is not in acute distress.     Appearance: She is not ill-appearing.   HENT:      Head: Normocephalic.      Mouth/Throat:      Mouth: Mucous membranes are moist.   Cardiovascular:      Rate and Rhythm: Normal rate and regular rhythm.   Pulmonary:      Effort: Pulmonary effort is normal.      Breath sounds: Normal breath sounds.   Abdominal:      General: Abdomen is flat. There is no distension.      Palpations: Abdomen is soft.      Tenderness: There is abdominal tenderness.      Comments: Lsc incisions well approximated, no signs of infection, abd soft, appropriately tender   Neurological:      Mental Status: She is alert and oriented to person, place, and time.              Recent Labs     03/03/25  1437 03/03/25  2316 03/05/25  0056   WBC  --  22.0* 22.8*   RBC 4.81 4.50 3.77*   HEMOGLOBIN 14.9 13.9 11.5*   HEMATOCRIT 43.2 41.2 34.3*   MCV 89.9 91.6 91.0   MCH 31 30.9 30.5   MCHC 34.5 33.7 33.5   RDW 12.3 40.6 40.6   PLATELETCT 321 272 278   MPV 8.5 9.8 10.1     Recent Labs     03/03/25  2316   SODIUM 135   POTASSIUM 3.6   CHLORIDE 98   CO2 24   GLUCOSE 129*   BUN 8   CREATININE 0.66   CALCIUM 8.6     Recent Labs     03/03/25 2316   ASTSGOT 34   ALTSGPT 21   TBILIRUBIN 0.7   ALKPHOSPHAT 78   GLOBULIN 3.4   INR 1.32*     Recent Labs     03/03/25 2316   APTT 30.5   INR 1.32*           Assessment and Plan: This is a 79 y.o. female who presents with abdominal pain, leukocytosis, and new finding of pelvic mass, now s/p robotic assisted RSO, omentectomy, findings consistent with Stage II ovarian cancer: "     #POD#1  -Pain not well controlled, scheduled Tylenol and toradol, Dilaudid PO and IV for BTP  -CLD, ADAT  -Encourage IS and ambulation    #Leukocytosis: likely secondary to torsion and reactive post op. Zosyn dc'd. No signs of infection. Will follow labs.     #Stage II Ovca: on frozen section, final pathology pending.  Will need adjuvant treatment, plan per Dr. Magana as outpatient.     #Hx Hypothyroidism: restart home meds.     #GI/FEN: CLD, advance diet as tolerated. Monitor lytes and replace prn    #Ppx: omeprazole, SCDs, ambulation     This case was discussed with Dr. Chino Fry PZackAZack-C.  Gynecology Oncology  Center [facility]

## 2025-03-05 NOTE — OR NURSING
1900 Pt arrived to PACU with Anesthesiologist and OR RN. AAOx4. Even, unlabored respirations. VSS.  5x lap site dressings CDI. Ice pack applied. Pt IV bloody with hematoma to L hand upon arrival. IV removed and pressure applied. Pt medicated for pain and nausea.    1932 Pt stated she feels like she is having a hard time breathing, pt sounds wheezy, nebulizer given with improvement    1952 POC update given to daughter, Jessie, over the phone. All questions answered.     2037 Report called to ANDRE Mario on CNU.     2048 Pt AAOx4, minimal drainage on lap sites, pt transported to R314 with transport. Chart with patient.

## 2025-03-05 NOTE — DISCHARGE PLANNING
Case Management Discharge Planning    Admission Date: 3/3/2025  GMLOS: 1.5  ALOS: 1    6-Clicks ADL Score: 20  6-Clicks Mobility Score: 14  PT and/or OT Eval ordered: Yes  Post-acute Referrals Ordered: Yes  Post-acute Choice Obtained: Yes  Has referral(s) been sent to post-acute provider:  Yes      Anticipated Discharge Dispo: Discharge Disposition: Discharged to home/self care (01)  [facility] [facility] [address]    DME Needed: No    Action(s) Taken: Discussed in IDT rounds, pt is POD #1 of robotic right salping oophorectomy. Waiting for Dr. Magana's team to round, pt is pending medical clearance.  Patient on 2L of O2, requested RN to wean O2, pt is RA at baseline.     Escalations Completed: None    Medically Clear: No    Next Steps: Pending medical clearance.     Barriers to Discharge: Medical clearance    Is the patient up for discharge tomorrow: No

## 2025-03-06 LAB
ANION GAP SERPL CALC-SCNC: 9 MMOL/L (ref 7–16)
BASOPHILS # BLD AUTO: 0.1 % (ref 0–1.8)
BASOPHILS # BLD: 0.02 K/UL (ref 0–0.12)
BUN SERPL-MCNC: 17 MG/DL (ref 8–22)
CALCIUM SERPL-MCNC: 8.2 MG/DL (ref 8.5–10.5)
CHLORIDE SERPL-SCNC: 97 MMOL/L (ref 96–112)
CO2 SERPL-SCNC: 24 MMOL/L (ref 20–33)
CREAT SERPL-MCNC: 0.67 MG/DL (ref 0.5–1.4)
EOSINOPHIL # BLD AUTO: 0.05 K/UL (ref 0–0.51)
EOSINOPHIL NFR BLD: 0.3 % (ref 0–6.9)
ERYTHROCYTE [DISTWIDTH] IN BLOOD BY AUTOMATED COUNT: 38.9 FL (ref 35.9–50)
GFR SERPLBLD CREATININE-BSD FMLA CKD-EPI: 89 ML/MIN/1.73 M 2
GLUCOSE SERPL-MCNC: 123 MG/DL (ref 65–99)
HCT VFR BLD AUTO: 27.3 % (ref 37–47)
HGB BLD-MCNC: 9.4 G/DL (ref 12–16)
IMM GRANULOCYTES # BLD AUTO: 0.12 K/UL (ref 0–0.11)
IMM GRANULOCYTES NFR BLD AUTO: 0.7 % (ref 0–0.9)
LYMPHOCYTES # BLD AUTO: 2.05 K/UL (ref 1–4.8)
LYMPHOCYTES NFR BLD: 11.1 % (ref 22–41)
MCH RBC QN AUTO: 30.8 PG (ref 27–33)
MCHC RBC AUTO-ENTMCNC: 34.4 G/DL (ref 32.2–35.5)
MCV RBC AUTO: 89.5 FL (ref 81.4–97.8)
MONOCYTES # BLD AUTO: 1.41 K/UL (ref 0–0.85)
MONOCYTES NFR BLD AUTO: 7.6 % (ref 0–13.4)
NEUTROPHILS # BLD AUTO: 14.81 K/UL (ref 1.82–7.42)
NEUTROPHILS NFR BLD: 80.2 % (ref 44–72)
NRBC # BLD AUTO: 0 K/UL
NRBC BLD-RTO: 0 /100 WBC (ref 0–0.2)
PLATELET # BLD AUTO: 280 K/UL (ref 164–446)
PMV BLD AUTO: 9.7 FL (ref 9–12.9)
POTASSIUM SERPL-SCNC: 3.9 MMOL/L (ref 3.6–5.5)
RBC # BLD AUTO: 3.05 M/UL (ref 4.2–5.4)
SODIUM SERPL-SCNC: 130 MMOL/L (ref 135–145)
WBC # BLD AUTO: 18.5 K/UL (ref 4.8–10.8)

## 2025-03-06 PROCEDURE — 85025 COMPLETE CBC W/AUTO DIFF WBC: CPT

## 2025-03-06 PROCEDURE — 700102 HCHG RX REV CODE 250 W/ 637 OVERRIDE(OP): Performed by: STUDENT IN AN ORGANIZED HEALTH CARE EDUCATION/TRAINING PROGRAM

## 2025-03-06 PROCEDURE — 770004 HCHG ROOM/CARE - ONCOLOGY PRIVATE *

## 2025-03-06 PROCEDURE — A9270 NON-COVERED ITEM OR SERVICE: HCPCS | Performed by: STUDENT IN AN ORGANIZED HEALTH CARE EDUCATION/TRAINING PROGRAM

## 2025-03-06 PROCEDURE — 80048 BASIC METABOLIC PNL TOTAL CA: CPT

## 2025-03-06 PROCEDURE — 36415 COLL VENOUS BLD VENIPUNCTURE: CPT

## 2025-03-06 PROCEDURE — 700111 HCHG RX REV CODE 636 W/ 250 OVERRIDE (IP): Performed by: STUDENT IN AN ORGANIZED HEALTH CARE EDUCATION/TRAINING PROGRAM

## 2025-03-06 RX ORDER — HYDROCHLOROTHIAZIDE 25 MG/1
12.5 TABLET ORAL ONCE
Status: COMPLETED | OUTPATIENT
Start: 2025-03-06 | End: 2025-03-06

## 2025-03-06 RX ADMIN — SIMVASTATIN 40 MG: 40 TABLET, FILM COATED ORAL at 20:24

## 2025-03-06 RX ADMIN — HYDROCHLOROTHIAZIDE 12.5 MG: 25 TABLET ORAL at 17:44

## 2025-03-06 RX ADMIN — LEVOTHYROXINE SODIUM 88 MCG: 0.09 TABLET ORAL at 04:30

## 2025-03-06 RX ADMIN — TRAZODONE HYDROCHLORIDE 150 MG: 100 TABLET ORAL at 20:24

## 2025-03-06 RX ADMIN — OMEPRAZOLE 20 MG: 20 CAPSULE, DELAYED RELEASE ORAL at 04:30

## 2025-03-06 RX ADMIN — ACETAMINOPHEN 1000 MG: 500 TABLET ORAL at 10:01

## 2025-03-06 RX ADMIN — POLYETHYLENE GLYCOL 3350 1 PACKET: 17 POWDER, FOR SOLUTION ORAL at 10:11

## 2025-03-06 RX ADMIN — KETOROLAC TROMETHAMINE 15 MG: 15 INJECTION, SOLUTION INTRAMUSCULAR; INTRAVENOUS at 13:57

## 2025-03-06 RX ADMIN — SENNOSIDES AND DOCUSATE SODIUM 2 TABLET: 50; 8.6 TABLET ORAL at 17:44

## 2025-03-06 RX ADMIN — KETOROLAC TROMETHAMINE 15 MG: 15 INJECTION, SOLUTION INTRAMUSCULAR; INTRAVENOUS at 21:42

## 2025-03-06 RX ADMIN — ACETAMINOPHEN 1000 MG: 500 TABLET ORAL at 17:44

## 2025-03-06 RX ADMIN — MONTELUKAST 10 MG: 10 TABLET, FILM COATED ORAL at 20:24

## 2025-03-06 RX ADMIN — POTASSIUM CHLORIDE 20 MEQ: 1500 TABLET, EXTENDED RELEASE ORAL at 04:30

## 2025-03-06 RX ADMIN — KETOROLAC TROMETHAMINE 15 MG: 15 INJECTION, SOLUTION INTRAMUSCULAR; INTRAVENOUS at 04:30

## 2025-03-06 ASSESSMENT — ENCOUNTER SYMPTOMS
FEVER: 0
NAUSEA: 1
MYALGIAS: 0
DIZZINESS: 0
SHORTNESS OF BREATH: 0
VOMITING: 0
CHILLS: 0
COUGH: 0
ABDOMINAL PAIN: 1

## 2025-03-06 ASSESSMENT — PAIN DESCRIPTION - PAIN TYPE
TYPE: ACUTE PAIN
TYPE: ACUTE PAIN
TYPE: ACUTE PAIN;SURGICAL PAIN
TYPE: ACUTE PAIN

## 2025-03-06 NOTE — CARE PLAN
The patient is Stable - Low risk of patient condition declining or worsening    Shift Goals  Clinical Goals: PRS 6/10 or less by end of shift. Patient will have bowel motility and tolerate mobilization  Patient Goals: Pain medication  Family Goals: Updates and to visit    Progress made toward(s) clinical / shift goals:    Problem: Pain - Standard  Goal: Alleviation of pain or a reduction in pain to the patient’s comfort goal  Outcome: Progressing  Note: Patient still requesting PRN pain medication but says her pain is now more controlled. Patient decided to skip one of the scheduled tylenol.      Problem: Knowledge Deficit - Standard  Goal: Patient and family/care givers will demonstrate understanding of plan of care, disease process/condition, diagnostic tests and medications  Outcome: Progressing       Patient is not progressing towards the following goals:

## 2025-03-06 NOTE — PROGRESS NOTES
Gynecologic Oncology Progress Note    Author: Katie Fry P.A.-C.    Date/Time: 3/6/2025 9:03 AM    Date of Admit: 3/3/2025    HD#: 2 POD#: 2    Interval History:  No acute overnight events. Doing well this AM. Pain well controlled. No nausea or vomiting, tolerating PO. + flatus. Didn't ambulate yesterday.        Review of Systems   Constitutional:  Positive for malaise/fatigue. Negative for chills and fever.   Respiratory:  Negative for cough and shortness of breath.    Cardiovascular:  Negative for chest pain and leg swelling.   Gastrointestinal:  Positive for abdominal pain and nausea. Negative for vomiting.   Genitourinary:  Negative for dysuria, frequency and urgency.   Musculoskeletal:  Negative for myalgias.   Neurological:  Negative for dizziness.            Allergies   Allergen Reactions    Peanut-Derived Anaphylaxis    Shellfish-Derived Products Anaphylaxis    Amitriptyline Itching     Doesn't work    Carbamazepine Itching     Doesn't work    Carisoprodol Itching     Doesn't work    Diclofenac Itching     Doesn't work    Hydrocodone Itching     Doesn't work    Metoclopramide Itching     Doesn't work    Nortriptyline Itching     Doesn't work    Nsaids Unspecified     GI upset    Oxycodone Itching     Makes me sick            Current Facility-Administered Medications:     acetaminophen (Tylenol) tablet 1,000 mg, 1,000 mg, Oral, Q8HRS, KING JallohA.-C., 1,000 mg at 03/05/25 1746    levothyroxine (Synthroid) tablet 88 mcg, 88 mcg, Oral, AM ES, KING JallohA.-C., 88 mcg at 03/06/25 0430    montelukast (Singulair) tablet 10 mg, 10 mg, Oral, QHS, ABDELRAHMAN Jalloh.A.-C., 10 mg at 03/05/25 2012    simvastatin (Zocor) tablet 40 mg, 40 mg, Oral, Nightly, ABDELRAHMAN Jalloh.A.-C., 40 mg at 03/05/25 2012    traZODone (Desyrel) tablet 150 mg, 150 mg, Oral, Nightly, ABDELRAHMAN Jalloh.A.-C., 150 mg at 03/05/25 2012    HYDROmorphone (Dilaudid) injection 1 mg, 1 mg, Intravenous, Q3HRS PRN, Katie  "EMERSON Gómez-CZack, 1 mg at 03/05/25 1047    ketorolac (Toradol) 15 MG/ML injection 15 mg, 15 mg, Intravenous, Q8HRS, IONA Jalloh.-C., 15 mg at 03/06/25 0430    senna-docusate (Pericolace Or Senokot S) 8.6-50 MG per tablet 2 Tablet, 2 Tablet, Oral, Q EVENING, 2 Tablet at 03/05/25 1745 **AND** polyethylene glycol/lytes (Miralax) Packet 1 Packet, 1 Packet, Oral, QDAY PRN, IONA Jalloh.-MARGRET.    potassium chloride SA (Kdur) tablet 20 mEq, 20 mEq, Oral, DAILY, IONA Jalloh.-C., 20 mEq at 03/06/25 0430    omeprazole (PriLOSEC) capsule 20 mg, 20 mg, Oral, DAILY, KING JallohA.-C., 20 mg at 03/06/25 0430    HYDROmorphone (Dilaudid) tablet 2 mg, 2 mg, Oral, Q3HRS PRN, Mckenna Bunch P.A.-C., 2 mg at 03/05/25 2022    ondansetron (Zofran) syringe/vial injection 4 mg, 4 mg, Intravenous, Q4HRS PRN, Johnson Magana M.D., 4 mg at 03/04/25 2113       Objective:     /64   Pulse 85   Temp 36.6 °C (97.8 °F) (Temporal)   Resp 18   Ht 1.651 m (5' 5\")   Wt 74.6 kg (164 lb 7.4 oz)   SpO2 92%     Physical Exam  Constitutional:       General: She is not in acute distress.     Appearance: She is not ill-appearing.   HENT:      Head: Normocephalic.      Mouth/Throat:      Mouth: Mucous membranes are moist.   Cardiovascular:      Rate and Rhythm: Normal rate and regular rhythm.   Pulmonary:      Effort: Pulmonary effort is normal.      Breath sounds: Normal breath sounds.   Abdominal:      General: Abdomen is flat. There is no distension.      Palpations: Abdomen is soft.      Tenderness: There is abdominal tenderness.      Comments: Lsc incisions well approximated, no signs of infection, abd soft, appropriately tender   Neurological:      Mental Status: She is alert and oriented to person, place, and time.              Recent Labs     03/03/25  2316 03/05/25  0056 03/06/25  0059   WBC 22.0* 22.8* 18.5*   RBC 4.50 3.77* 3.05*   HEMOGLOBIN 13.9 11.5* 9.4*   HEMATOCRIT 41.2 34.3* 27.3*   MCV 91.6 " 91.0 89.5   MCH 30.9 30.5 30.8   MCHC 33.7 33.5 34.4   RDW 40.6 40.6 38.9   PLATELETCT 272 278 280   MPV 9.8 10.1 9.7     Recent Labs     03/03/25 2316 03/05/25  0950   SODIUM 135 131*   POTASSIUM 3.6 3.5*   CHLORIDE 98 96   CO2 24 24   GLUCOSE 129* 181*   BUN 8 12   CREATININE 0.66 0.54   CALCIUM 8.6 8.1*     Recent Labs     03/03/25 2316   ASTSGOT 34   ALTSGPT 21   TBILIRUBIN 0.7   ALKPHOSPHAT 78   GLOBULIN 3.4   INR 1.32*     Recent Labs     03/03/25 2316   APTT 30.5   INR 1.32*           Assessment and Plan: This is a 79 y.o. female who presents with abdominal pain, leukocytosis, and new finding of pelvic mass, now s/p robotic assisted RSO, omentectomy, findings consistent with Stage II ovarian cancer:     #POD#2  -Pain improved, continue Tylenol and Toradol, with dilaudid as needed   -Tolerating regular diet   -Voiding spontaneously   -Encourage IS and ambulation    #Leukocytosis: downtrending, likely secondary to torsion and reactive post op. Zosyn dc'd. No signs of infection. Will follow labs.     #Stage II Ovca: on frozen section, final pathology pending.  Will need adjuvant treatment, plan per Dr. Magana as outpatient.     #Hx Hypothyroidism: restart home meds.     #GI/FEN: CLD, advance diet as tolerated. Monitor lytes and replace prn    #Ppx: omeprazole, SCDs, ambulation     Dispo: continue inpatient post operative management, anticipate discharge tomorrow.     This case was discussed with Dr. Chino Fry, P.A.-C.  Gynecology Oncology  Center [facility]

## 2025-03-07 ENCOUNTER — PHARMACY VISIT (OUTPATIENT)
Dept: PHARMACY | Facility: MEDICAL CENTER | Age: 80
End: 2025-03-07
Payer: COMMERCIAL

## 2025-03-07 VITALS
DIASTOLIC BLOOD PRESSURE: 63 MMHG | OXYGEN SATURATION: 93 % | TEMPERATURE: 98.2 F | SYSTOLIC BLOOD PRESSURE: 149 MMHG | HEIGHT: 65 IN | WEIGHT: 164.46 LBS | BODY MASS INDEX: 27.4 KG/M2 | HEART RATE: 86 BPM | RESPIRATION RATE: 17 BRPM

## 2025-03-07 LAB
ANION GAP SERPL CALC-SCNC: 11 MMOL/L (ref 7–16)
BASOPHILS # BLD AUTO: 0.4 % (ref 0–1.8)
BASOPHILS # BLD: 0.04 K/UL (ref 0–0.12)
BUN SERPL-MCNC: 16 MG/DL (ref 8–22)
CALCIUM SERPL-MCNC: 8.8 MG/DL (ref 8.5–10.5)
CHLORIDE SERPL-SCNC: 98 MMOL/L (ref 96–112)
CO2 SERPL-SCNC: 24 MMOL/L (ref 20–33)
CREAT SERPL-MCNC: 0.66 MG/DL (ref 0.5–1.4)
EOSINOPHIL # BLD AUTO: 0.54 K/UL (ref 0–0.51)
EOSINOPHIL NFR BLD: 5 % (ref 0–6.9)
ERYTHROCYTE [DISTWIDTH] IN BLOOD BY AUTOMATED COUNT: 40 FL (ref 35.9–50)
GFR SERPLBLD CREATININE-BSD FMLA CKD-EPI: 89 ML/MIN/1.73 M 2
GLUCOSE SERPL-MCNC: 109 MG/DL (ref 65–99)
HCT VFR BLD AUTO: 31.9 % (ref 37–47)
HGB BLD-MCNC: 10.7 G/DL (ref 12–16)
IMM GRANULOCYTES # BLD AUTO: 0.04 K/UL (ref 0–0.11)
IMM GRANULOCYTES NFR BLD AUTO: 0.4 % (ref 0–0.9)
LYMPHOCYTES # BLD AUTO: 3.24 K/UL (ref 1–4.8)
LYMPHOCYTES NFR BLD: 30 % (ref 22–41)
MCH RBC QN AUTO: 30.1 PG (ref 27–33)
MCHC RBC AUTO-ENTMCNC: 33.5 G/DL (ref 32.2–35.5)
MCV RBC AUTO: 89.6 FL (ref 81.4–97.8)
MONOCYTES # BLD AUTO: 0.88 K/UL (ref 0–0.85)
MONOCYTES NFR BLD AUTO: 8.1 % (ref 0–13.4)
NEUTROPHILS # BLD AUTO: 6.06 K/UL (ref 1.82–7.42)
NEUTROPHILS NFR BLD: 56.1 % (ref 44–72)
NRBC # BLD AUTO: 0 K/UL
NRBC BLD-RTO: 0 /100 WBC (ref 0–0.2)
PLATELET # BLD AUTO: 387 K/UL (ref 164–446)
PMV BLD AUTO: 9.4 FL (ref 9–12.9)
POTASSIUM SERPL-SCNC: 3.7 MMOL/L (ref 3.6–5.5)
RBC # BLD AUTO: 3.56 M/UL (ref 4.2–5.4)
SODIUM SERPL-SCNC: 133 MMOL/L (ref 135–145)
WBC # BLD AUTO: 10.8 K/UL (ref 4.8–10.8)

## 2025-03-07 PROCEDURE — 700111 HCHG RX REV CODE 636 W/ 250 OVERRIDE (IP): Mod: JZ | Performed by: SPECIALIST

## 2025-03-07 PROCEDURE — 700111 HCHG RX REV CODE 636 W/ 250 OVERRIDE (IP): Performed by: STUDENT IN AN ORGANIZED HEALTH CARE EDUCATION/TRAINING PROGRAM

## 2025-03-07 PROCEDURE — A9270 NON-COVERED ITEM OR SERVICE: HCPCS | Performed by: STUDENT IN AN ORGANIZED HEALTH CARE EDUCATION/TRAINING PROGRAM

## 2025-03-07 PROCEDURE — A9270 NON-COVERED ITEM OR SERVICE: HCPCS

## 2025-03-07 PROCEDURE — 85025 COMPLETE CBC W/AUTO DIFF WBC: CPT

## 2025-03-07 PROCEDURE — 80048 BASIC METABOLIC PNL TOTAL CA: CPT

## 2025-03-07 PROCEDURE — RXMED WILLOW AMBULATORY MEDICATION CHARGE: Performed by: STUDENT IN AN ORGANIZED HEALTH CARE EDUCATION/TRAINING PROGRAM

## 2025-03-07 PROCEDURE — 36415 COLL VENOUS BLD VENIPUNCTURE: CPT

## 2025-03-07 PROCEDURE — 700102 HCHG RX REV CODE 250 W/ 637 OVERRIDE(OP): Performed by: STUDENT IN AN ORGANIZED HEALTH CARE EDUCATION/TRAINING PROGRAM

## 2025-03-07 PROCEDURE — 700102 HCHG RX REV CODE 250 W/ 637 OVERRIDE(OP)

## 2025-03-07 RX ORDER — ONDANSETRON 4 MG/1
TABLET, ORALLY DISINTEGRATING ORAL
Qty: 28 TABLET | Refills: 0 | OUTPATIENT
Start: 2025-03-07

## 2025-03-07 RX ORDER — ONDANSETRON 4 MG/1
4 TABLET, ORALLY DISINTEGRATING ORAL EVERY 4 HOURS PRN
Status: DISCONTINUED | OUTPATIENT
Start: 2025-03-07 | End: 2025-03-07 | Stop reason: HOSPADM

## 2025-03-07 RX ORDER — HYDROMORPHONE HYDROCHLORIDE 2 MG/1
TABLET ORAL
Qty: 12 TABLET | Refills: 0 | OUTPATIENT
Start: 2025-03-07 | End: 2025-03-07

## 2025-03-07 RX ORDER — PROCHLORPERAZINE MALEATE 10 MG
10 TABLET ORAL EVERY 6 HOURS PRN
Status: DISCONTINUED | OUTPATIENT
Start: 2025-03-07 | End: 2025-03-07 | Stop reason: HOSPADM

## 2025-03-07 RX ADMIN — ONDANSETRON 4 MG: 2 INJECTION INTRAMUSCULAR; INTRAVENOUS at 03:56

## 2025-03-07 RX ADMIN — OMEPRAZOLE 20 MG: 20 CAPSULE, DELAYED RELEASE ORAL at 04:56

## 2025-03-07 RX ADMIN — LEVOTHYROXINE SODIUM 88 MCG: 0.09 TABLET ORAL at 04:56

## 2025-03-07 RX ADMIN — KETOROLAC TROMETHAMINE 15 MG: 15 INJECTION, SOLUTION INTRAMUSCULAR; INTRAVENOUS at 04:56

## 2025-03-07 RX ADMIN — HYDROMORPHONE HYDROCHLORIDE 2 MG: 4 TABLET ORAL at 09:21

## 2025-03-07 RX ADMIN — ACETAMINOPHEN 1000 MG: 500 TABLET ORAL at 01:34

## 2025-03-07 RX ADMIN — ONDANSETRON 4 MG: 4 TABLET, ORALLY DISINTEGRATING ORAL at 09:36

## 2025-03-07 RX ADMIN — POTASSIUM CHLORIDE 20 MEQ: 1500 TABLET, EXTENDED RELEASE ORAL at 09:07

## 2025-03-07 RX ADMIN — HYDROMORPHONE HYDROCHLORIDE 2 MG: 4 TABLET ORAL at 15:19

## 2025-03-07 RX ADMIN — ACETAMINOPHEN 1000 MG: 500 TABLET ORAL at 09:07

## 2025-03-07 RX ADMIN — POLYETHYLENE GLYCOL 3350 1 PACKET: 17 POWDER, FOR SOLUTION ORAL at 09:21

## 2025-03-07 ASSESSMENT — PAIN DESCRIPTION - PAIN TYPE
TYPE: ACUTE PAIN

## 2025-03-07 NOTE — CARE PLAN
The patient is Watcher - Medium risk of patient condition declining or worsening    Shift Goals  Clinical Goals: Pain <6 and have a bowel movment  Patient Goals: have a bowel movment and ambulate  Family Goals: PATRICK    Progress made toward(s) clinical / shift goals:  A/OX4, patient is able to understand plan of care. All questions answered at this time. Pain is being controlled through PRN medications per the MAR. Patient is stating a comfortable pain level. Patient ambulated through the jimenez twice during my shift. The patient increased her PO intake and continued her bowel regimen per the MAR to promote a bowel movement. Patient remained free of falls throughout shift, fall precautions in place. Bed in lowest position, belongings and call light in reach.

## 2025-03-07 NOTE — DISCHARGE SUMMARY
Discharge Summary    CHIEF COMPLAINT ON ADMISSION  Chief Complaint   Patient presents with    Abdominal Pain     Right lower quadrant pain radiating across the abdomen x 4 days, pt is transfer from [facility] for OBGYN consult.          Reason for Admission  EMS     Admission Date  3/3/2025    CODE STATUS  Full Code    HPI & HOSPITAL COURSE  This is a 79 y.o. female with PMHx of hypothyroidism, insomnia, asthma, and HLD, PSHx of reported hysterectomy/BSO, appendectomy, and cholecystectomy who presented to [facility] ED with CC of abdominal pain and nausea. Initial workup included CT scan that showed a large mass in the lower abdomen. Ultrasound was obtained that showed a mixed cystic solid mass of the right of midline measuring 6.4 x 4.7 x 5.6 cm. This appears to have no internal blood flow and the concern was for possible torsion versus more likely pelvic mass/malignancy.  908.3, CEA wnl.      Pt reports hysterectomy/BSO was 2/2 miscarriage at age 26 and that they removed tubes and ovaries for sure. She denies any abnormal pap smears or history of other cancers. She did endorse being on oral HRT for years after her hysterectomy and experienced hot flashes.She was in her normal state of health until 5 days ago when she developed nausea without vomiting, abdominal distension, constipation, and urinary urgency. She denies any urinary changes prior, any abd pain prior, or any changes in her bowel habits prior to 5 days ago.  It was recommended she undergo surgical intervention.     She subsequently underwent a robotic assisted right salpingo oophorectomy, omentectomy with no complications. Her pain was controlled with oral medication post operatively. Her diet was advanced with no issues. She was voiding spontaneously. She was ambulating. Her physical exam was remarkable for lsc incisions well approximated, abd soft, appropriately tender. She was meeting all post operative milestones.     Therefore, she is discharged  in good and stable condition to home with close outpatient follow-up.    The patient met 2-midnight criteria for an inpatient stay at the time of discharge.    Discharge Date  03/07/2024    FOLLOW UP ITEMS POST DISCHARGE  1. No heavy lifting greater than 10 pounds for a minimum 6 weeks  2. Nothing in the vagina (ie no tampons, douching, intercourse) for a minimum of 6 weeks  3. No driving while taking narcotics  4. Call our office [phone] if you develop any fevers, chills, nausea/vomiting, heavy vaginal bleeding, or redness, tenderness, and/or  drainage from your wound, if you have persistent watery discharge while ambulating or stool draining from the vagina.  5. Showering with warm water is ok, no bathing or swimming  6. You may remove wound dressing on postoperative day 1, and place lose bandages for two weeks  7. You may have vaginal spotting or light bleeding which is normal.  8. If you have not had a bowel movement for 2 days, please take over the counter Milk of Magnesium, 1 tablespoon every 4 hours. After 4  doses and if you still have not had a bowel movement, please call your doctor.  9. You may eat a soft diet, such as soup, liquid, for day #1 and if tolerating you may resume your regular diet      DISCHARGE DIAGNOSES  Hx hypothyroidism  Insomnia  Asthma  HLD  Ovarian cancer       FOLLOW UP  No future appointments.  [facility]   Follow up at beginning of April, we will call you when this is scheduled       MEDICATIONS ON DISCHARGE     Medication List        START taking these medications        Instructions   ondansetron 4 MG Tbdp  Commonly known as: Zofran ODT   Dissolve 1 tablet by mouth every 6 hours as needed for nausea            CONTINUE taking these medications        Instructions   levothyroxine 88 MCG Tabs  Commonly known as: Synthroid   Take 88 mcg by mouth every morning on an empty stomach.  Dose: 88 mcg     montelukast 10 MG Tabs  Commonly known as: Singulair   Take 10 mg by mouth at  bedtime.  Dose: 10 mg     simvastatin 40 MG Tabs  Commonly known as: Zocor   Take 40 mg by mouth every evening.  Dose: 40 mg     traZODone 150 MG Tabs  Commonly known as: Desyrel   Take 150 mg by mouth every evening.  Dose: 150 mg              Allergies  Allergies   Allergen Reactions    Peanut-Derived Anaphylaxis    Shellfish-Derived Products Anaphylaxis    Amitriptyline Itching     Doesn't work    Carbamazepine Itching     Doesn't work    Carisoprodol Itching     Doesn't work    Diclofenac Itching     Doesn't work    Hydrocodone Itching     Doesn't work    Metoclopramide Itching     Doesn't work    Nortriptyline Itching     Doesn't work    Nsaids Unspecified     GI upset    Oxycodone Itching     Makes me sick       DIET  Orders Placed This Encounter   Procedures    Diet Order Diet: Low Fiber(GI Soft)     Standing Status:   Standing     Number of Occurrences:   1     Diet::   Low Fiber(GI Soft) [2]       ACTIVITY  As tolerated.  10-lb lifting restriction    CONSULTATIONS  None    PROCEDURES  Robotic assisted right salpingo oophorectomy, omentectomy    LABORATORY  Lab Results   Component Value Date    SODIUM 133 (L) 03/07/2025    POTASSIUM 3.7 03/07/2025    CHLORIDE 98 03/07/2025    CO2 24 03/07/2025    GLUCOSE 109 (H) 03/07/2025    BUN 16 03/07/2025    CREATININE 0.66 03/07/2025    CREATININE 0.7 01/15/2007    GLOMRATE 66 02/24/2023        Lab Results   Component Value Date    WBC 10.8 03/07/2025    HEMOGLOBIN 10.7 (L) 03/07/2025    HEMATOCRIT 31.9 (L) 03/07/2025    PLATELETCT 387 03/07/2025        Total time of the discharge process exceeds 25 minutes.

## 2025-03-07 NOTE — DISCHARGE INSTRUCTIONS
1. No heavy lifting greater than 10 pounds for a minimum 6 weeks    2. Nothing in the vagina (ie no tampons, douching, intercourse) for a minimum of 6 weeks    3. No driving while taking narcotics    4. Call our office [phone] if you develop any fevers, chills, nausea/vomiting, heavy vaginal bleeding, or redness, tenderness, and/ordrainage from your wound, if you have persistent watery discharge while ambulating or stool draining from the vagina.    5. Showering with warm water is ok, no bathing or swimming    6. You may remove wound dressing on postoperative day 1, and place lose bandages for two weeks    7. You may have vaginal spotting or light bleeding which is normal.    8. If you have not had a bowel movement for 2 days, please take over the counter Milk of Magnesium, 1 tablespoon every 4 hours. After 4 doses and if you still have not had a bowel movement, please call your doctor.    Take Tylenol 650 - 1000 mg every 8 hours as needed for pain   Take Ibuprofen 600 mg every 8 hours as needed for pain   Take Dilaudid 2 mg every 6 hours as needed for pain     Take Zofran every 6 hours as needed for nausea     We will call and schedule you a follow up appointment

## 2025-03-07 NOTE — DISCHARGE PLANNING
Case Management Discharge Planning    Admission Date: 3/3/2025  GMLOS: 3.7  ALOS: 3    6-Clicks ADL Score: 20  6-Clicks Mobility Score: 18      Anticipated Discharge Dispo: Discharge Disposition: Discharged to home/self care (01)  Discharge Address: [address] [facility], [address]    DME Needed: No    Action(s) Taken: Discharge order is in. Patient was wean off oxygen. Patient has no CM needs.     Escalations Completed: None    Medically Clear: No    Next Steps: no other CM needs     Barriers to Discharge: Medical clearance    Is the patient up for discharge tomorrow: No

## 2025-03-07 NOTE — CARE PLAN
The patient is Stable - Low risk of patient condition declining or worsening    Shift Goals  Clinical Goals: pain control, safety  Patient Goals: rest, go home tomorrow  Family Goals: PATRICK    Progress made toward(s) clinical / shift goals:    Problem: Pain - Standard  Goal: Alleviation of pain or a reduction in pain to the patient’s comfort goal  Outcome: Progressing     Problem: Fall Risk  Goal: Patient will remain free from falls  Outcome: Progressing     Problem: Knowledge Deficit - Standard  Goal: Patient and family/care givers will demonstrate understanding of plan of care, disease process/condition, diagnostic tests and medications  Outcome: Progressing       Patient is not progressing towards the following goals:

## 2025-03-09 LAB
BACTERIA BLD CULT: NORMAL
BACTERIA BLD CULT: NORMAL
SIGNIFICANT IND 70042: NORMAL
SIGNIFICANT IND 70042: NORMAL
SITE SITE: NORMAL
SITE SITE: NORMAL
SOURCE SOURCE: NORMAL
SOURCE SOURCE: NORMAL

## (undated) DEVICE — PACK MAJOR BASIN - (2EA/CA)

## (undated) DEVICE — SUTURE 2-0 ETHILON FS - (36/BX) 18 INCH

## (undated) DEVICE — GLOVE BIOGEL PI ORTHO SZ 7.5 PF LF (40PR/BX)

## (undated) DEVICE — SLEEVE, VASO, THIGH, MED

## (undated) DEVICE — ELECTRODE DUAL RETURN W/ CORD - (50/PK)

## (undated) DEVICE — FORCEPS PROGRASP (18UN/EA)

## (undated) DEVICE — CLIP INTERNAL LIGATE TITANIUM MEDIUM WECK HORIZON (6EA/PK 30PK/BX)

## (undated) DEVICE — DRAPE ARM BOX OF 20

## (undated) DEVICE — SUTURE 0 VICRYL PLUS CT-2 - 27 INCH (36/BX)

## (undated) DEVICE — SUCTION INSTRUMENT YANKAUER BULBOUS TIP W/O VENT (50EA/CA)

## (undated) DEVICE — LACTATED RINGERS INJ 1000 ML - (14EA/CA 60CA/PF)

## (undated) DEVICE — CANISTER SUCTION 3000ML MECHANICAL FILTER AUTO SHUTOFF MEDI-VAC NONSTERILE LF DISP (40EA/CA)

## (undated) DEVICE — SUTURE QUILL 0 PDO 14X14 - (12/BX)

## (undated) DEVICE — PACK GYN DAVINCI (1EA/CA)

## (undated) DEVICE — TRAY SRGPRP PVP IOD WT PRP - (20/CA)

## (undated) DEVICE — PAD OR TABLE DA VINCI 2IN X 20IN X 72IN - (12EA/CA)

## (undated) DEVICE — SUTURE 3-0 MONOCRYL PLUS PS-1 - 27 INCH (36/BX)

## (undated) DEVICE — DRESSING NON-ADHERING 8 X 3 - (50/BX)

## (undated) DEVICE — TUBING CLEARLINK DUO-VENT - C-FLO (48EA/CA)

## (undated) DEVICE — DRAPE UNDER BUTTOCKS FLUID - (20/CA)

## (undated) DEVICE — SUTURE GENERAL

## (undated) DEVICE — SUTURE 0 COATED VICRYL 6-18IN - (12PK/BX)

## (undated) DEVICE — DRAPE COLUMN BOX OF 20

## (undated) DEVICE — SET LEADWIRE 5 LEAD BEDSIDE DISPOSABLE ECG (1SET OF 5/EA)

## (undated) DEVICE — SPECIMEN BAG ENDOCATCH II15MM 15MM SPECIMEN RETRIEVAL (3EA/CA)

## (undated) DEVICE — TUBE CONNECT SUCTION CLEAR 120 X 1/4" (50EA/CA)"

## (undated) DEVICE — SPONGE GAUZESTER 4 X 4 4PLY - (128PK/CA)

## (undated) DEVICE — BLADE SURGICAL #10 - (50/BX)

## (undated) DEVICE — TUBE E-T HI-LO CUFF 6.5MM (10EA/BX)

## (undated) DEVICE — SYRINGE ASEPTO - (50EA/CA

## (undated) DEVICE — PAD LAP STERILE 18 X 18 - (5/PK 40PK/CA)

## (undated) DEVICE — TRANSDUCER ADULT DISP. SINGLE BONDED STERILE - (10EA/CA)

## (undated) DEVICE — SYRINGE 50ML CATHETER TIP (40EA/BX 4BX/CA)

## (undated) DEVICE — COVER LIGHT HANDLE ALC PLUS DISP (18EA/BX)

## (undated) DEVICE — ARMREST CRADLE FOAM - (2PR/PK 12PR/CA)

## (undated) DEVICE — SUTURE 3-0 VICRYL PLUS SH - 8X 18 INCH (12/BX)

## (undated) DEVICE — SPATULA PERMANENT CAUTERY DA VINCI 10X'S REUSABLE

## (undated) DEVICE — BIPOLAR FORCE DA VINCI 12X'S REISABLE

## (undated) DEVICE — SET FLUID WARMING STANDARD FLOW - (10/CA)

## (undated) DEVICE — SUTURE 3-0 VICRYL PLUS SH - 27 INCH (36/BX)

## (undated) DEVICE — DA VINCI INSUFFLATOR TUBE SET - SMOKE EVACUATION (6EA/BX)

## (undated) DEVICE — SLEEVE VASO DVT COMPRESSION CALF MED - (10PR/CA)

## (undated) DEVICE — CORETEMP DRAPE FORM-FITTED EASY DROPANDGO DRAPE FOR USE ON THE CORETEMP FLUID MANAGEMENT 56IN X 56IN

## (undated) DEVICE — COVER FOOT UNIVERSAL DISP. - (25EA/CA)

## (undated) DEVICE — SODIUM CHL IRRIGATION 0.9% 1000ML (12EA/CA)

## (undated) DEVICE — NEEDLE DRIVER MEGA SUTURECUT DA VINCI 15X'S REUSABLE

## (undated) DEVICE — GLOVE COTTON STERILE (50/CA)

## (undated) DEVICE — CANISTER SUCTION RIGID RED 1500CC (40EA/CA)

## (undated) DEVICE — PACK TRENGUARD 450 PROCEDURE (12EA/CA)

## (undated) DEVICE — CHLORAPREP 26 ML APPLICATOR - ORANGE TINT(25/CA)

## (undated) DEVICE — GOWN WARMING STANDARD FLEX - (30/CA)

## (undated) DEVICE — GOWN SURGICAL X-LARGE ULTRA - FILM-REINFORCED (20/CA)

## (undated) DEVICE — NEEDLE INSFL 120MM 14GA VRRS - (20/BX)

## (undated) DEVICE — DRESSING TRANSPARENT FILM TEGADERM 2.375 X 2.75" (100EA/BX)"

## (undated) DEVICE — SET EXTENSION WITH 2 PORTS (48EA/CA) ***PART #2C8610 IS A SUBSTITUTE*****

## (undated) DEVICE — GLOVE BIOGEL PI INDICATOR SZ 7.0 SURGICAL PF LF - (50/BX 4BX/CA)

## (undated) DEVICE — CLIP MED LG INTNL HRZN TI ESCP - (20/BX)

## (undated) DEVICE — CLIP LG INTNL HRZN TI ESCP LGT - (20/BX)

## (undated) DEVICE — CATHETER FOLEY 22 FR 30CC - (12EA/CA)

## (undated) DEVICE — DRAPE STRLE REG TOWEL 18X24 - (10/BX 4BX/CA)"

## (undated) DEVICE — LEGGING LITHOTOMY 31 X 48 IN - (2EA/PK 20PK/CA)

## (undated) DEVICE — SENSOR OXIMETER ADULT SPO2 RD SET (20EA/BX)

## (undated) DEVICE — TOWELS CLOTH SURGICAL - (4/PK 20PK/CA)

## (undated) DEVICE — TRAY, CV CATH MULTI-LUM.AK-25703

## (undated) DEVICE — TUBE NG SALEM SUMP 16FR (50EA/CA)

## (undated) DEVICE — SUTURE 2-0 VICRYL PLUS TP-1 - (24/BX)

## (undated) DEVICE — SYRINGE NON SAFETY TB 1 CC 27 GA X 1/2 IN (100/BX 8BX/CA)

## (undated) DEVICE — TRAY FOLEY CATHETER STATLOCK 16FR SURESTEP (10EA/CA)

## (undated) DEVICE — BOVIE BLADE 6 EXTENDED - (50/PK)

## (undated) DEVICE — TUBE LUKI TRAP STERILE DISP. - 8CC (12/BX 4BX/CA)

## (undated) DEVICE — SUTURE 3-0 SILK SH C/R 18 IN - (12/BX)

## (undated) DEVICE — FILTER BLOOD TRANSFUSION - (40/CA) (PALL)

## (undated) DEVICE — STAPLER SKIN DISP - (6/BX 10BX/CA) VISISTAT

## (undated) DEVICE — SEAL UNIVERSAL 5MM-12MM (10EA/BX)